# Patient Record
Sex: MALE | Race: WHITE | Employment: UNEMPLOYED | URBAN - METROPOLITAN AREA
[De-identification: names, ages, dates, MRNs, and addresses within clinical notes are randomized per-mention and may not be internally consistent; named-entity substitution may affect disease eponyms.]

---

## 2017-06-27 DIAGNOSIS — I10 ESSENTIAL HYPERTENSION: ICD-10-CM

## 2017-06-27 RX ORDER — LOSARTAN POTASSIUM 100 MG/1
100 TABLET ORAL DAILY
Qty: 90 TAB | Refills: 1 | OUTPATIENT
Start: 2017-06-27

## 2017-06-27 NOTE — TELEPHONE ENCOUNTER
Incoming fax received requesting refill for Cozaar 100mg daily. Pt was last seen by a provider on 3/25/16. Refill denied with note that pt needs to see provider before refills can be approved. Faxed to [phone].   Tori Linares RN

## 2018-04-20 ENCOUNTER — HOSPITAL ENCOUNTER (OUTPATIENT)
Dept: PREADMISSION TESTING | Age: 53
Discharge: HOME | End: 2018-04-20
Attending: COLON & RECTAL SURGERY
Payer: SUBSIDIZED

## 2018-04-20 ENCOUNTER — HOSPITAL ENCOUNTER (OUTPATIENT)
Dept: GENERAL RADIOLOGY | Age: 53
Discharge: HOME | End: 2018-04-20
Attending: COLON & RECTAL SURGERY
Payer: SUBSIDIZED

## 2018-04-20 VITALS
HEART RATE: 62 BPM | OXYGEN SATURATION: 97 % | BODY MASS INDEX: 31.87 KG/M2 | RESPIRATION RATE: 18 BRPM | DIASTOLIC BLOOD PRESSURE: 93 MMHG | WEIGHT: 203.04 LBS | TEMPERATURE: 98.7 F | SYSTOLIC BLOOD PRESSURE: 165 MMHG | HEIGHT: 67 IN

## 2018-04-20 LAB
ALBUMIN SERPL-MCNC: 3.9 G/DL (ref 3.5–5)
ALBUMIN/GLOB SERPL: 1 {RATIO} (ref 1.1–2.2)
ALP SERPL-CCNC: 132 U/L (ref 45–117)
ALT SERPL-CCNC: 26 U/L (ref 12–78)
ANION GAP SERPL CALC-SCNC: 2 MMOL/L (ref 5–15)
AST SERPL-CCNC: 18 U/L (ref 15–37)
ATRIAL RATE: 58 BPM
BILIRUB SERPL-MCNC: 0.4 MG/DL (ref 0.2–1)
BUN SERPL-MCNC: 13 MG/DL (ref 6–20)
BUN/CREAT SERPL: 12 (ref 12–20)
CALCIUM SERPL-MCNC: 9.1 MG/DL (ref 8.5–10.1)
CALCULATED P AXIS, ECG09: 57 DEGREES
CALCULATED R AXIS, ECG10: 62 DEGREES
CALCULATED T AXIS, ECG11: 40 DEGREES
CHLORIDE SERPL-SCNC: 109 MMOL/L (ref 97–108)
CO2 SERPL-SCNC: 31 MMOL/L (ref 21–32)
CREAT SERPL-MCNC: 1.05 MG/DL (ref 0.7–1.3)
DIAGNOSIS, 93000: NORMAL
ERYTHROCYTE [DISTWIDTH] IN BLOOD BY AUTOMATED COUNT: 12.7 % (ref 11.5–14.5)
GLOBULIN SER CALC-MCNC: 3.8 G/DL (ref 2–4)
GLUCOSE SERPL-MCNC: 105 MG/DL (ref 65–100)
HCT VFR BLD AUTO: 45.5 % (ref 36.6–50.3)
HGB BLD-MCNC: 15.7 G/DL (ref 12.1–17)
MCH RBC QN AUTO: 30.5 PG (ref 26–34)
MCHC RBC AUTO-ENTMCNC: 34.5 G/DL (ref 30–36.5)
MCV RBC AUTO: 88.5 FL (ref 80–99)
NRBC # BLD: 0 K/UL (ref 0–0.01)
NRBC BLD-RTO: 0 PER 100 WBC
P-R INTERVAL, ECG05: 152 MS
PLATELET # BLD AUTO: 211 K/UL (ref 150–400)
PMV BLD AUTO: 9 FL (ref 8.9–12.9)
POTASSIUM SERPL-SCNC: 4.6 MMOL/L (ref 3.5–5.1)
PROT SERPL-MCNC: 7.7 G/DL (ref 6.4–8.2)
Q-T INTERVAL, ECG07: 388 MS
QRS DURATION, ECG06: 88 MS
QTC CALCULATION (BEZET), ECG08: 380 MS
RBC # BLD AUTO: 5.14 M/UL (ref 4.1–5.7)
SODIUM SERPL-SCNC: 142 MMOL/L (ref 136–145)
VENTRICULAR RATE, ECG03: 58 BPM
WBC # BLD AUTO: 6.9 K/UL (ref 4.1–11.1)

## 2018-04-20 PROCEDURE — 80053 COMPREHEN METABOLIC PANEL: CPT | Performed by: COLON & RECTAL SURGERY

## 2018-04-20 PROCEDURE — 71046 X-RAY EXAM CHEST 2 VIEWS: CPT

## 2018-04-20 PROCEDURE — 93005 ELECTROCARDIOGRAM TRACING: CPT

## 2018-04-20 PROCEDURE — 85027 COMPLETE CBC AUTOMATED: CPT | Performed by: COLON & RECTAL SURGERY

## 2018-04-20 PROCEDURE — 36415 COLL VENOUS BLD VENIPUNCTURE: CPT | Performed by: COLON & RECTAL SURGERY

## 2018-04-20 RX ORDER — CALCIUM CARBONATE/VITAMIN D3 500-10/5ML
1 LIQUID (ML) ORAL AS NEEDED
COMMUNITY

## 2018-04-20 RX ORDER — BISMUTH SUBSALICYLATE 262 MG
1 TABLET,CHEWABLE ORAL AS NEEDED
COMMUNITY

## 2018-04-20 NOTE — PERIOP NOTES
[facility]  Preoperative Instructions        Surgery Date 4/27/2018          Time of Arrival 5:45 a.m.    1. On the day of your surgery, please report to the Surgical Services Registration Desk and sign in at your designated time. The Surgery Center is located to the right of the Emergency Room. 2. You must have someone with you to drive you home. You should not drive a car for 24 hours following surgery. Please make arrangements for a friend or family member to stay with you for the first 24 hours after your surgery. 3. Do not have anything to eat or drink (including water, gum, mints, coffee, juice) after midnight. ?This may not apply to medications prescribed by your physician. ?(Please note below the special instructions with medications to take the morning of your procedure.)    4. We recommend you do not drink any alcoholic beverages for 24 hours before and after your surgery. 5. Contact your surgeons office for instructions on the following medications: non-steroidal anti-inflammatory drugs (i.e. Advil, Aleve), vitamins, and supplements. (Some surgeons will want you to stop these medications prior to surgery and others may allow you to take them)  **If you are currently taking Plavix, Coumadin, Aspirin and/or other blood-thinning agents, contact your surgeon for instructions. ** Your surgeon will partner with the physician prescribing these medications to determine if it is safe to stop or if you need to continue taking. Please do not stop taking these medications without instructions from your surgeon    6. Wear comfortable clothes. Wear glasses instead of contacts. Do not bring any money or jewelry. Please bring picture ID, insurance card, and any prearranged co-payment or hospital payment. Do not wear make-up, particularly mascara the morning of your surgery. Do not wear nail polish, particularly if you are having foot /hand surgery.   Wear your hair loose or down, no ponytails, buns, carin pins or clips. All body piercings must be removed. Please shower with antibacterial soap for three consecutive days before and on the morning of surgery, but do not apply any lotions, powders or deodorants after the shower on the day of surgery. Please use a fresh towels after each shower. Please sleep in clean clothes and change bed linens the night before surgery. Please do not shave for 48 hours prior to surgery. Shaving of the face is acceptable. 7. You should understand that if you do not follow these instructions your surgery may be cancelled. If your physical condition changes (I.e. fever, cold or flu) please contact your surgeon as soon as possible. 8. It is important that you be on time. If a situation occurs where you may be late, please call [phone] (OR Holding Area). 9. If you have any questions and or problems, please call [phone] (Pre-admission Testing). 10. Your surgery time may be subject to change. You will receive a phone call the evening prior if your time changes. 11.  If having outpatient surgery, you must have someone to drive you here, stay with you during the duration of your stay, and to drive you home at time of discharge. 12.   In an effort to improve the efficiency, privacy, and safety for all of our Pre-op patients visitors are not allowed in the Holding area. Once you arrive and are registered your family/visitors will be asked to remain in the waiting room. The Pre-op staff will get you from the Surgical Waiting Area and will explain to you and your family/visitors that the Pre-op phase is beginning. The staff will answer any questions and provide instructions for tracking of the patient, by use of the existing tracking number and color-coded status board in the waiting room.   At this time the staff will also ask for your designated spokesperson information in the event that the physician or staff need to provide an update or obtain any pertinent information. The designated spokesperson will be notified if the physician needs to speak to family during the pre-operative phase. If at any time your family/visitors has questions or concerns they may approach the volunteer desk in the waiting area for assistance. Special Instructions:    MEDICATIONS TO TAKE THE MORNING OF SURGERY WITH A SIP OF WATER: carvedilol      I understand a pre-operative phone call will be made to verify my surgery time. In the event that I am not available, I give permission for a message to be left on my answering service and/or with another person?   Yes [phone] (daughter Jesus Weirton)         ___________________      __________   _________    Libertad Arden of Patient)             (Witness)                (Date and Time)

## 2018-04-20 NOTE — PERIOP NOTES
Patient here for PAT appointment. Medical history and medications reviewed, consent reviewed and signed using [employer],  Liang Elliott #[id number].

## 2018-04-23 RX ORDER — SODIUM CHLORIDE, SODIUM LACTATE, POTASSIUM CHLORIDE, CALCIUM CHLORIDE 600; 310; 30; 20 MG/100ML; MG/100ML; MG/100ML; MG/100ML
25 INJECTION, SOLUTION INTRAVENOUS CONTINUOUS
Status: CANCELLED | OUTPATIENT
Start: 2018-04-27

## 2018-04-23 NOTE — PERIOP NOTES
Pre op CXR results faxed to Dr. Fred Loya and Nancy Whitmore NP at [facility]. Both faxes confirmed.

## 2018-04-27 ENCOUNTER — HOSPITAL ENCOUNTER (OUTPATIENT)
Age: 53
Setting detail: OUTPATIENT SURGERY
Discharge: HOME | End: 2018-04-27
Attending: COLON & RECTAL SURGERY | Admitting: COLON & RECTAL SURGERY
Payer: SUBSIDIZED

## 2018-04-27 ENCOUNTER — ANESTHESIA EVENT (OUTPATIENT)
Dept: SURGERY | Age: 53
End: 2018-04-27
Payer: SUBSIDIZED

## 2018-04-27 ENCOUNTER — ANESTHESIA (OUTPATIENT)
Dept: SURGERY | Age: 53
End: 2018-04-27
Payer: SUBSIDIZED

## 2018-04-27 VITALS
HEIGHT: 67 IN | WEIGHT: 198.85 LBS | RESPIRATION RATE: 20 BRPM | BODY MASS INDEX: 31.21 KG/M2 | HEART RATE: 77 BPM | TEMPERATURE: 98 F | DIASTOLIC BLOOD PRESSURE: 78 MMHG | OXYGEN SATURATION: 97 % | SYSTOLIC BLOOD PRESSURE: 152 MMHG

## 2018-04-27 DIAGNOSIS — K64.2 PROLAPSED INTERNAL HEMORRHOIDS, GRADE 3: Primary | ICD-10-CM

## 2018-04-27 PROCEDURE — 76060000033 HC ANESTHESIA 1 TO 1.5 HR: Performed by: COLON & RECTAL SURGERY

## 2018-04-27 PROCEDURE — 74011250636 HC RX REV CODE- 250/636: Performed by: ANESTHESIOLOGY

## 2018-04-27 PROCEDURE — 77030002996 HC SUT SLK J&J -A: Performed by: COLON & RECTAL SURGERY

## 2018-04-27 PROCEDURE — 74011000272 HC RX REV CODE- 272: Performed by: COLON & RECTAL SURGERY

## 2018-04-27 PROCEDURE — 76210000022 HC REC RM PH II 1.5 TO 2 HR: Performed by: COLON & RECTAL SURGERY

## 2018-04-27 PROCEDURE — 74011250636 HC RX REV CODE- 250/636

## 2018-04-27 PROCEDURE — 74011000250 HC RX REV CODE- 250

## 2018-04-27 PROCEDURE — 77030026438 HC STYL ET INTUB CARD -A: Performed by: ANESTHESIOLOGY

## 2018-04-27 PROCEDURE — 77030011640 HC PAD GRND REM COVD -A: Performed by: COLON & RECTAL SURGERY

## 2018-04-27 PROCEDURE — 88304 TISSUE EXAM BY PATHOLOGIST: CPT | Performed by: COLON & RECTAL SURGERY

## 2018-04-27 PROCEDURE — 74011250637 HC RX REV CODE- 250/637

## 2018-04-27 PROCEDURE — 77030008684 HC TU ET CUF COVD -B: Performed by: ANESTHESIOLOGY

## 2018-04-27 PROCEDURE — 77030031139 HC SUT VCRL2 J&J -A: Performed by: COLON & RECTAL SURGERY

## 2018-04-27 PROCEDURE — 77030002888 HC SUT CHRMC J&J -A: Performed by: COLON & RECTAL SURGERY

## 2018-04-27 PROCEDURE — 76010000149 HC OR TIME 1 TO 1.5 HR: Performed by: COLON & RECTAL SURGERY

## 2018-04-27 PROCEDURE — 76210000063 HC OR PH I REC FIRST 0.5 HR: Performed by: COLON & RECTAL SURGERY

## 2018-04-27 PROCEDURE — 77030032490 HC SLV COMPR SCD KNE COVD -B: Performed by: COLON & RECTAL SURGERY

## 2018-04-27 PROCEDURE — 77030018836 HC SOL IRR NACL ICUM -A: Performed by: COLON & RECTAL SURGERY

## 2018-04-27 PROCEDURE — 74011000250 HC RX REV CODE- 250: Performed by: COLON & RECTAL SURGERY

## 2018-04-27 RX ORDER — LIDOCAINE HYDROCHLORIDE 10 MG/ML
0.1 INJECTION, SOLUTION EPIDURAL; INFILTRATION; INTRACAUDAL; PERINEURAL AS NEEDED
Status: DISCONTINUED | OUTPATIENT
Start: 2018-04-27 | End: 2018-04-27 | Stop reason: HOSPADM

## 2018-04-27 RX ORDER — OXYCODONE AND ACETAMINOPHEN 5; 325 MG/1; MG/1
TABLET ORAL
Status: COMPLETED
Start: 2018-04-27 | End: 2018-04-27

## 2018-04-27 RX ORDER — BUPIVACAINE HYDROCHLORIDE 2.5 MG/ML
INJECTION, SOLUTION EPIDURAL; INFILTRATION; INTRACAUDAL AS NEEDED
Status: DISCONTINUED | OUTPATIENT
Start: 2018-04-27 | End: 2018-04-27 | Stop reason: HOSPADM

## 2018-04-27 RX ORDER — KETOROLAC TROMETHAMINE 30 MG/ML
15 INJECTION, SOLUTION INTRAMUSCULAR; INTRAVENOUS
Status: COMPLETED | OUTPATIENT
Start: 2018-04-27 | End: 2018-04-27

## 2018-04-27 RX ORDER — SODIUM CHLORIDE, SODIUM LACTATE, POTASSIUM CHLORIDE, CALCIUM CHLORIDE 600; 310; 30; 20 MG/100ML; MG/100ML; MG/100ML; MG/100ML
100 INJECTION, SOLUTION INTRAVENOUS CONTINUOUS
Status: DISCONTINUED | OUTPATIENT
Start: 2018-04-27 | End: 2018-04-27 | Stop reason: HOSPADM

## 2018-04-27 RX ORDER — FENTANYL CITRATE 50 UG/ML
INJECTION, SOLUTION INTRAMUSCULAR; INTRAVENOUS AS NEEDED
Status: DISCONTINUED | OUTPATIENT
Start: 2018-04-27 | End: 2018-04-27 | Stop reason: HOSPADM

## 2018-04-27 RX ORDER — FENTANYL CITRATE 50 UG/ML
25 INJECTION, SOLUTION INTRAMUSCULAR; INTRAVENOUS ONCE
Status: COMPLETED | OUTPATIENT
Start: 2018-04-27 | End: 2018-04-27

## 2018-04-27 RX ORDER — SODIUM CHLORIDE 0.9 % (FLUSH) 0.9 %
5-10 SYRINGE (ML) INJECTION AS NEEDED
Status: DISCONTINUED | OUTPATIENT
Start: 2018-04-27 | End: 2018-04-27 | Stop reason: HOSPADM

## 2018-04-27 RX ORDER — ONDANSETRON 2 MG/ML
INJECTION INTRAMUSCULAR; INTRAVENOUS AS NEEDED
Status: DISCONTINUED | OUTPATIENT
Start: 2018-04-27 | End: 2018-04-27 | Stop reason: HOSPADM

## 2018-04-27 RX ORDER — LIDOCAINE HYDROCHLORIDE 20 MG/ML
INJECTION, SOLUTION EPIDURAL; INFILTRATION; INTRACAUDAL; PERINEURAL AS NEEDED
Status: DISCONTINUED | OUTPATIENT
Start: 2018-04-27 | End: 2018-04-27 | Stop reason: HOSPADM

## 2018-04-27 RX ORDER — FENTANYL CITRATE 50 UG/ML
25 INJECTION, SOLUTION INTRAMUSCULAR; INTRAVENOUS
Status: DISCONTINUED | OUTPATIENT
Start: 2018-04-27 | End: 2018-04-27 | Stop reason: HOSPADM

## 2018-04-27 RX ORDER — SODIUM CHLORIDE, SODIUM LACTATE, POTASSIUM CHLORIDE, CALCIUM CHLORIDE 600; 310; 30; 20 MG/100ML; MG/100ML; MG/100ML; MG/100ML
25 INJECTION, SOLUTION INTRAVENOUS CONTINUOUS
Status: DISCONTINUED | OUTPATIENT
Start: 2018-04-27 | End: 2018-04-27 | Stop reason: HOSPADM

## 2018-04-27 RX ORDER — OXYCODONE AND ACETAMINOPHEN 5; 325 MG/1; MG/1
1 TABLET ORAL
Qty: 60 TAB | Refills: 0 | Status: SHIPPED | OUTPATIENT
Start: 2018-04-27

## 2018-04-27 RX ORDER — OXYCODONE AND ACETAMINOPHEN 5; 325 MG/1; MG/1
1 TABLET ORAL ONCE
Status: COMPLETED | OUTPATIENT
Start: 2018-04-27 | End: 2018-04-27

## 2018-04-27 RX ORDER — SODIUM CHLORIDE 9 MG/ML
25 INJECTION, SOLUTION INTRAVENOUS CONTINUOUS
Status: DISCONTINUED | OUTPATIENT
Start: 2018-04-27 | End: 2018-04-27 | Stop reason: HOSPADM

## 2018-04-27 RX ORDER — MIDAZOLAM HYDROCHLORIDE 1 MG/ML
1 INJECTION, SOLUTION INTRAMUSCULAR; INTRAVENOUS AS NEEDED
Status: DISCONTINUED | OUTPATIENT
Start: 2018-04-27 | End: 2018-04-27 | Stop reason: HOSPADM

## 2018-04-27 RX ORDER — MIDAZOLAM HYDROCHLORIDE 1 MG/ML
INJECTION, SOLUTION INTRAMUSCULAR; INTRAVENOUS AS NEEDED
Status: DISCONTINUED | OUTPATIENT
Start: 2018-04-27 | End: 2018-04-27 | Stop reason: HOSPADM

## 2018-04-27 RX ORDER — HYDROMORPHONE HYDROCHLORIDE 1 MG/ML
0.5 INJECTION, SOLUTION INTRAMUSCULAR; INTRAVENOUS; SUBCUTANEOUS
Status: DISCONTINUED | OUTPATIENT
Start: 2018-04-27 | End: 2018-04-27 | Stop reason: HOSPADM

## 2018-04-27 RX ORDER — DEXAMETHASONE SODIUM PHOSPHATE 4 MG/ML
INJECTION, SOLUTION INTRA-ARTICULAR; INTRALESIONAL; INTRAMUSCULAR; INTRAVENOUS; SOFT TISSUE AS NEEDED
Status: DISCONTINUED | OUTPATIENT
Start: 2018-04-27 | End: 2018-04-27 | Stop reason: HOSPADM

## 2018-04-27 RX ORDER — IBUPROFEN 200 MG
200 TABLET ORAL
COMMUNITY

## 2018-04-27 RX ORDER — SUCCINYLCHOLINE CHLORIDE 20 MG/ML
INJECTION INTRAMUSCULAR; INTRAVENOUS AS NEEDED
Status: DISCONTINUED | OUTPATIENT
Start: 2018-04-27 | End: 2018-04-27 | Stop reason: HOSPADM

## 2018-04-27 RX ORDER — MIDAZOLAM HYDROCHLORIDE 1 MG/ML
0.5 INJECTION, SOLUTION INTRAMUSCULAR; INTRAVENOUS
Status: DISCONTINUED | OUTPATIENT
Start: 2018-04-27 | End: 2018-04-27 | Stop reason: HOSPADM

## 2018-04-27 RX ORDER — ROPIVACAINE HYDROCHLORIDE 5 MG/ML
30 INJECTION, SOLUTION EPIDURAL; INFILTRATION; PERINEURAL AS NEEDED
Status: DISCONTINUED | OUTPATIENT
Start: 2018-04-27 | End: 2018-04-27 | Stop reason: HOSPADM

## 2018-04-27 RX ORDER — FENTANYL CITRATE 50 UG/ML
50 INJECTION, SOLUTION INTRAMUSCULAR; INTRAVENOUS AS NEEDED
Status: DISCONTINUED | OUTPATIENT
Start: 2018-04-27 | End: 2018-04-27 | Stop reason: HOSPADM

## 2018-04-27 RX ORDER — SODIUM CHLORIDE 0.9 % (FLUSH) 0.9 %
5-10 SYRINGE (ML) INJECTION EVERY 8 HOURS
Status: DISCONTINUED | OUTPATIENT
Start: 2018-04-27 | End: 2018-04-27 | Stop reason: HOSPADM

## 2018-04-27 RX ORDER — PROPOFOL 10 MG/ML
INJECTION, EMULSION INTRAVENOUS AS NEEDED
Status: DISCONTINUED | OUTPATIENT
Start: 2018-04-27 | End: 2018-04-27 | Stop reason: HOSPADM

## 2018-04-27 RX ORDER — EPHEDRINE SULFATE 50 MG/ML
INJECTION, SOLUTION INTRAVENOUS AS NEEDED
Status: DISCONTINUED | OUTPATIENT
Start: 2018-04-27 | End: 2018-04-27 | Stop reason: HOSPADM

## 2018-04-27 RX ORDER — DIPHENHYDRAMINE HYDROCHLORIDE 50 MG/ML
12.5 INJECTION, SOLUTION INTRAMUSCULAR; INTRAVENOUS AS NEEDED
Status: DISCONTINUED | OUTPATIENT
Start: 2018-04-27 | End: 2018-04-27 | Stop reason: HOSPADM

## 2018-04-27 RX ORDER — PHENYLEPHRINE HCL IN 0.9% NACL 0.4MG/10ML
SYRINGE (ML) INTRAVENOUS AS NEEDED
Status: DISCONTINUED | OUTPATIENT
Start: 2018-04-27 | End: 2018-04-27 | Stop reason: HOSPADM

## 2018-04-27 RX ORDER — ROCURONIUM BROMIDE 10 MG/ML
INJECTION, SOLUTION INTRAVENOUS AS NEEDED
Status: DISCONTINUED | OUTPATIENT
Start: 2018-04-27 | End: 2018-04-27 | Stop reason: HOSPADM

## 2018-04-27 RX ORDER — BUPIVACAINE HYDROCHLORIDE AND EPINEPHRINE 2.5; 5 MG/ML; UG/ML
INJECTION, SOLUTION EPIDURAL; INFILTRATION; INTRACAUDAL; PERINEURAL AS NEEDED
Status: DISCONTINUED | OUTPATIENT
Start: 2018-04-27 | End: 2018-04-27 | Stop reason: HOSPADM

## 2018-04-27 RX ORDER — MORPHINE SULFATE 10 MG/ML
2 INJECTION, SOLUTION INTRAMUSCULAR; INTRAVENOUS
Status: DISCONTINUED | OUTPATIENT
Start: 2018-04-27 | End: 2018-04-27 | Stop reason: HOSPADM

## 2018-04-27 RX ORDER — ONDANSETRON 2 MG/ML
4 INJECTION INTRAMUSCULAR; INTRAVENOUS AS NEEDED
Status: DISCONTINUED | OUTPATIENT
Start: 2018-04-27 | End: 2018-04-27 | Stop reason: HOSPADM

## 2018-04-27 RX ADMIN — Medication 40 MCG: at 07:50

## 2018-04-27 RX ADMIN — Medication 40 MCG: at 07:51

## 2018-04-27 RX ADMIN — SODIUM CHLORIDE, SODIUM LACTATE, POTASSIUM CHLORIDE, AND CALCIUM CHLORIDE 25 ML/HR: 600; 310; 30; 20 INJECTION, SOLUTION INTRAVENOUS at 06:33

## 2018-04-27 RX ADMIN — DEXAMETHASONE SODIUM PHOSPHATE 10 MG: 4 INJECTION, SOLUTION INTRA-ARTICULAR; INTRALESIONAL; INTRAMUSCULAR; INTRAVENOUS; SOFT TISSUE at 08:25

## 2018-04-27 RX ADMIN — ONDANSETRON 4 MG: 2 INJECTION INTRAMUSCULAR; INTRAVENOUS at 08:25

## 2018-04-27 RX ADMIN — PROPOFOL 30 MG: 10 INJECTION, EMULSION INTRAVENOUS at 08:03

## 2018-04-27 RX ADMIN — SUCCINYLCHOLINE CHLORIDE 140 MG: 20 INJECTION INTRAMUSCULAR; INTRAVENOUS at 07:36

## 2018-04-27 RX ADMIN — FENTANYL CITRATE 25 MCG: 50 INJECTION, SOLUTION INTRAMUSCULAR; INTRAVENOUS at 10:30

## 2018-04-27 RX ADMIN — ROCURONIUM BROMIDE 10 MG: 10 INJECTION, SOLUTION INTRAVENOUS at 07:36

## 2018-04-27 RX ADMIN — Medication 80 MCG: at 07:54

## 2018-04-27 RX ADMIN — EPHEDRINE SULFATE 10 MG: 50 INJECTION, SOLUTION INTRAVENOUS at 07:54

## 2018-04-27 RX ADMIN — KETOROLAC TROMETHAMINE 15 MG: 30 INJECTION, SOLUTION INTRAMUSCULAR at 10:32

## 2018-04-27 RX ADMIN — LIDOCAINE HYDROCHLORIDE 100 MG: 20 INJECTION, SOLUTION EPIDURAL; INFILTRATION; INTRACAUDAL; PERINEURAL at 07:36

## 2018-04-27 RX ADMIN — PROPOFOL 180 MG: 10 INJECTION, EMULSION INTRAVENOUS at 07:36

## 2018-04-27 RX ADMIN — MIDAZOLAM HYDROCHLORIDE 2 MG: 1 INJECTION, SOLUTION INTRAMUSCULAR; INTRAVENOUS at 07:33

## 2018-04-27 RX ADMIN — FENTANYL CITRATE 25 MCG: 50 INJECTION, SOLUTION INTRAMUSCULAR; INTRAVENOUS at 08:03

## 2018-04-27 RX ADMIN — FENTANYL CITRATE 75 MCG: 50 INJECTION, SOLUTION INTRAMUSCULAR; INTRAVENOUS at 07:36

## 2018-04-27 RX ADMIN — OXYCODONE HYDROCHLORIDE AND ACETAMINOPHEN 1 TABLET: 5; 325 TABLET ORAL at 09:51

## 2018-04-27 RX ADMIN — OXYCODONE AND ACETAMINOPHEN 1 TABLET: 5; 325 TABLET ORAL at 09:51

## 2018-04-27 NOTE — PERIOP NOTES
Handoff Report from Operating Room to PACU    Report received from [address] [place] [address] [facility] and Kanchan Reyes RN regarding Wiley Pea. Surgeon(s):  Lindsey Camargo MD  And Procedure(s) (LRB):  HEMORRHOIDECTOMY  (N/A)  confirmed   with allergies and dressings discussed. Anesthesia type, drugs, patient history, complications, estimated blood loss, vital signs, intake and output, and last pain medication, lines and temperature were reviewed.

## 2018-04-27 NOTE — OP NOTES
Preop dx: hemorrhoids  Postop dx: same  Procedure: Excisional hemorrhoidectomy  Surgeon: Dr. Sara Alexandra  Anesthesia: gen +30cc 0.25%marcaine with epi  EBL: 5cc  Specimen: left lateral and right posterior grade 3 internal/external hemorrhoids  Complications:  none apparent  Condition: stable to recovery room    Indications: bleeding hemorrhoids    The patient was taken to the OR after being consented. After smooth induction of anesthesia, the patient was positioned prone on the table. All extremities were padded. Time out was performed. Local anesthetic was infiltrated for a local block. The patient was found to have 2 hemorrhoids. The left lateral column was addressed first. A large hill ford anoscope was inserted. The skin was elevated and a v-shaped incision was made on the perineum. The hemorrhoid was undermined taking care to avoid injury to the underlying sphincter complex. The dissection was taken to the apex of the hemorrhoid and ligated with a 3-0 chromic. The incision was closed using a running locking stitch in the anal canal and a running simple suture on the anal margin. This was hemostatic. The remaining columns of hemorrhoids and skin tags were removed in a similar fashion. Gel foam was applied. Sterile gauze was applied. The instrument and sponge counts were correct x2. The patient tolerated the procedure well and was transferred to the recovery room in stable condition.

## 2018-04-27 NOTE — PERIOP NOTES
phone used for Russian speaking pt, pre op interview done, reviewed what pt can expect, opportunity for questions by pt provided. Pt states comfortable.

## 2018-04-27 NOTE — IP AVS SNAPSHOT
[address] [facility] 
[phone] Patient: Freddie Mathew MRN: [id number] :1965 About your hospitalization You were admitted on:  2018 You last received care in the:  [facility] PACU You were discharged on:  2018 Why you were hospitalized Your primary diagnosis was:  Not on File Your diagnoses also included:  Prolapsed Internal Hemorrhoids, Grade 3 Follow-up Information Follow up With Details Comments Contact Info Jalen Mathews MD   [place] [address] [address] [place] [address] 
[phone] Discharge Orders None A check vince indicates which time of day the medication should be taken. My Medications START taking these medications Instructions Each Dose to Equal  
 Morning Noon Evening Bedtime  
 oxyCODONE-acetaminophen 5-325 mg per tablet Commonly known as:  PERCOCET Your last dose was: Your next dose is: Take 1 Tab by mouth every four (4) hours as needed for Pain. Max Daily Amount: 6 Tabs. 1 Tab CONTINUE taking these medications Instructions Each Dose to Equal  
 Morning Noon Evening Bedtime ADVIL 200 mg tablet Generic drug:  ibuprofen Your last dose was: Your next dose is: Take 200 mg by mouth. 200 mg  
    
   
   
   
  
 aspirin 81 mg chewable tablet Your last dose was: Your next dose is: Take 1 Tab by mouth daily. 81 mg  
    
   
   
   
  
 carvedilol 25 mg tablet Commonly known as:  Michelle Fish Your last dose was: Your next dose is: Take 1 Tab by mouth two (2) times daily (with meals). mike 1 tab por la boca 2 veces al edith  
 25 mg  
    
   
   
   
  
 losartan 100 mg tablet Commonly known as:  COZAAR Your last dose was: Your next dose is: Take 1 Tab by mouth daily. mike 1 tab por la boca diaria  
 100 mg  
    
   
   
   
  
 magnesium oxide 400 mg Cap Your last dose was: Your next dose is: Take 1 Tab by mouth as needed. 1 Tab  
    
   
   
   
  
 multivitamin tablet Commonly known as:  ONE A DAY Your last dose was: Your next dose is: Take 1 Tab by mouth as needed. 1 Tab Where to Get Your Medications Information on where to get these meds will be given to you by the nurse or doctor. ! Ask your nurse or doctor about these medications  
  oxyCODONE-acetaminophen 5-325 mg per tablet Opioid Education Prescription Opioids: What You Need to Know: 
 
 
[facility] Discharge Instructions for Ambulatory 23-Hour Surgery Patients 1. Advance to high fiber diet as tolerated. 2. Take Metamucil/Citrucel 1 teaspoon mixed in a glass of water in AM and PM. 
3. Remove dressing at 4PM. 4. Take 2 sitz baths today (warm water baths for 15-20 minutes). Begin four (4) times a day the day after surgery. 5. Apply Nupercainal Ointment (does not need a prescription) to the anal area after sitz baths, place a dry 4x4 gauze over the are between the buttocks. 6. Take pain medication as prescribed. (NO DRIVING WHILE ON PAIN MEDICATION). 7. No lifting any objects weighing more than 40 pounds. 8. No sitting more than 45 minutes without standing, walking, or lying down. 9. You may walk as desired. 10.  Please schedule an appointment in the office within 10-14 days. Call ahead to schedule your appointment [phone]. 11.  Call [facility] [phone] if you have any problems or questions after   hours. 12.  Expect slight bright red blood up to four (4) weeks from surgery. 15.  Stitches are the dissolving type  they do not need removal in the office. 14.  If no bowel movement by 4/29/2018, take 30cc (1 tablespoon) of Milk of Magnesia. Repeat if no results. If still no bowel movement the next day, then call the office. A common side effect of anesthesia following surgery is nausea and/or vomiting. In order to decrease symptoms, it is wise to avoid foods that are high in fat, greasy foods, milk products, and spicy foods for the first 24 hours. Acceptable foods for the first 24 hours following surgery include but are not limited to: 
 
? soup 
? broth 
?  toast  
? crackers ? applesauce 
? bananas  
? mashed potatoes, 
? soft or scrambled eggs 
? oatmeal 
?  jello It is important to eat when taking your pain medication. This will help to prevent nausea. If possible, please try to time your meals with your medications. It is very important to stay hydrated following surgery. Sip fluids frequently while awake. Avoid acidic drinks such as citrus juices and soda for 24 hours. Carbonated beverages may cause bloating and gas. Acceptable fluids include: 
 
? water (flavor packets may add variety) ? coffee or tea (in moderation) ? Gatorade ? Mirza Snowman ? apple juice 
? cranberry juice You are encouraged to cough and deep breathe every hour when awake. This will help to prevent respiratory complications following anesthesia. You may want to hug a pillow when coughing and sneezing to add additional support to the surgical area and to decrease discomfort if you had abdominal or chest surgery.  
 
If you are discharged home with support stockings, you may remove them after 24 hours. Support stockings are used to help prevent blood clots in the legs following surgery. Please take time to review all of your Home Care Instructions and Medication Information sheets provided in your discharge packet. If you have any questions, please contact your surgeons office. Thank you. Hemorroidectomía: Leana Foley en el hogar - [ Hemorrhoidectomy: What to Expect at [facility] ] Krishna recuperación Después de que se le hayan extraído las hemorroides, skyler puede esperar sentirse mejor cada día. Krishna tremayne anal estará adolorida de 2 a 4 semanas. Y podría necesitar analgésicos (medicamentos para el dolor). Es común tener algo de sangrado leve y líquidos angelica o amarillos que salen del ano. [place] es más probable cuando evacue el intestino. Estos síntomas podrían durar entre 1 y 2 meses después de la [facility]. Después de 1 o 2 semanas, usgreg debería poder [facility] de [place] actividades normales. Domenic no kianna cosas que requieran mucho esfuerzo. Mientras se recupera, es importante que evite levantar objetos pesados y que no kianna esfuerzos cuando evacue el intestino. Esta hoja de [employer] idea general del tiempo que le llevará recuperarse. Sin embargo, cada persona se recupera a un ritmo diferente. Siga los pasos que se mencionan a continuación para recuperarse lo más rápido posible. Cómo puede cuidarse en el [facility]? Actividad ? · Descanse cuando se sienta fatigado. ? · Zachary Gillespie. Caminar es fritz buena opción. ? · Deje que krishna cuerpo sane. No se mueva con rapidez ni levante nada pesado hasta que se sienta mejor. ? · Puede bañarse o ducharse diana de costumbre. Cuando termine, séquese la tremayne anal con toques suaves de toalla. ? · Es probable que necesite ausentarse del [place] Vora 1 y 2 semanas. [place] dependerá del tipo de trabajo que kianna y cómo se sienta. Alimentación ? · Siga las instrucciones del médico sobre cómo alimentarse después de la [facility]. ? · Comience a agregar alimentos con alto contenido de fibra a suh dieta 2 o 3 días después de la [place]. [place] facilitará la evacuación del intestino. Y reduce las probabilidades de que vuelva a tener hemorroides. ? · Si no evacua el intestino con regularidad elliot después de la cirugía, trate de evitar el estreñimiento y de no hacer esfuerzos. Minal suficiente agua. Es posible que suh médico le sugiera que tome Dominique, un ablandador de heces o un laxante [place]. Medicamentos ? · Suh médico le dirá si puede volver a jensen [place] medicamentos y cuándo puede volver a hacerlo. También le dará indicaciones sobre cualquier medicamento nuevo que deba jensen usted. ? · Si mike medicamentos que previenen la formación de coágulos de [place], diana warfarina (Coumadin), clopidogrel (Plavix) o aspirina, asegúrese de hablar con suh médico. Él o pham le dirá si debe volver a jensen estos medicamentos y en qué momento. Asegúrese de que entiende exactamente lo que el médico quiere que omar. ? · Sea luan con los medicamentos. Deborah y siga todas las instrucciones de la [place]. ¨ Si el médico le recetó un analgésico (medicamento para el dolor), tómelo según las indicaciones. ¨ Si no está tomando un analgésico recetado, pregúntele a suh médico si puede jensen un medicamento de [employer]. ? · Si suh médico le recetó antibióticos, tómelos según las indicaciones. No deje de tomarlos por el hecho de sentirse mejor. Debe jensen todos los antibióticos hasta terminarlos. ? · Puede aplicarse anestésicos antes y después de las evacuaciones para aliviar el dolor. Otras instrucciones ? · Siéntese en unos pocos centímetros de agua tibia (baño de asiento) de 15 a 20 minutos 3 veces al día, y después de evacuar el intestino. Luego seque la tremayne con toanastacio de toalla. Omar esto siempre que sienta dolor en la tremayne anal.  
? · Evite sentarse en el inodoro por largos períodos de tiempo o esforzarse alice la evacuación del intestino. ? · Mantenga limpia la tremayne anal.  
? · Apoye los pies sobre un banco o taburete pequeño cuando se siente en el inodoro. [place] ayuda a flexionar las caderas y coloca la pelvis en posición de cuclillas. [place] puede facilitar la evacuación del intestino después de la [place]. ? · Utilice toallitas para bebés o toallitas medicinales, diana Tucks, en lugar de papel higiénico después de evacuar el intestino. Estos productos no irritan el ano.  
? · Si suh médico se lo recomienda, utilice crema de hidrocortisona de venta jason sobre la piel de la tremayne anal. [place] puede reducir el dolor y la comezón después de la [place]. ? · Colóquese hielo varias veces al día alice 10 minutos cada vez.  
? · Intente acostarse boca abajo con fritz almohada debajo de las caderas para disminuir la hinchazón. La atención de seguimiento es fritz parte clave de suh tratamiento y seguridad. Asegúrese de hacer y acudir a todas las citas, y llame a suh médico si está teniendo problemas. También es fritz buena idea saber los resultados de los exámenes y mantener fritz lista de los medicamentos que mike. Cuándo debe pedir ayuda? Llame al 911 en cualquier momento que considere que necesita atención de [place]. Por ejemplo, llame si: 
? · Se desmayó (perdió el conocimiento). ? · Tiene dolor repentino en el pecho y falta de [place], o tose terry. ? · Tiene un dolor abdominal intenso. ? Llame a suh médico ahora mismo o busque atención médica inmediata si: 
? · El ano le sangra y empapa 2 o más compresas de gasa grandes. ? · Tiene dolor que no mejora después de jensen suh analgésico.  
? · Tiene señales de infección, tales diana: ¨ Aumento del dolor, la hinchazón, el enrojecimiento o la temperatura. ¨ Vetas rojizas que salen de la herida. ¨ Pus que supura de la herida. Catalina Fontan. ? · Tiene problemas para orinar o evacuar el intestino, en especial si tiene dolor o hinchazón en la parte baja del abdomen. ?Preste especial atención a los cambios en krishna jonah y asegúrese de comunicarse con krishna médico si: 
? · No evacua el intestino después de jensen un laxante. ? · No mejora diana se esperaba. Dónde puede encontrar más información en inglés? Brenda Mehta a [email or web address] Coreen North G703 en la búsqueda para aprender Stefanie Wallace de \"Hemorroidectomía: Bridgett Aiyana en el [facility] - [ Hemorrhoidectomy: What to Expect at [facility] ]. \" 
Revisado: 12 hall, 2017 Versión del contenido: 11.4 © 2310-4662 Healthwise, Incorporated. Las instrucciones de cuidado fueron adaptadas bajo licencia por [employer] (which disclaims liability or warranty for this information). Si usted tiene [place] afección médica o sobre estas instrucciones, siempre pregunte a krishna profesional de jonah. Healthwise, Incorporated niega toda garantía o responsabilidad por krishna uso de esta información. Hemorroidectomía: Bridgett Bronson en el [facility] - [ Hemorrhoidectomy: What to Expect at [facility] ] Krishna recuperación Después de que se le hayan extraído las hemorroides, usted puede esperar sentirse mejor cada día. Krishna tremayne anal estará adolorida de 2 a 4 semanas. Y podría necesitar analgésicos (medicamentos para el dolor). Es común tener algo de sangrado leve y líquidos angelica o amarillos que salen del ano. [place] es más probable cuando evacue el intestino. Estos síntomas podrían durar entre 1 y 2 meses después de la [place]. Después de 1 o 2 semanas, usted debería poder [facility] de [place] actividades normales. Domenic no kianna cosas que requieran mucho esfuerzo. Mientras se recupera, es importante que evite levantar objetos pesados y que no kianna esfuerzos cuando evacue el intestino. Esta hoja de [employer] idea general del tiempo que le llevará recuperarse. Sin embargo, cada persona se recupera a un ritmo diferente. Siga los pasos que se mencionan a continuación para recuperarse lo más rápido posible. Cómo puede cuidarse en el hogar? Actividad ? · Descanse cuando se sienta fatigado. ? · Sharlot Copper. Caminar es fritz buena opción. ? · Deje que suh cuerpo sane. No se mueva con rapidez ni levante nada pesado hasta que se sienta mejor. ? · Puede bañarse o ducharse diana de costumbre. Cuando termine, séquese la tremayne anal con toques suaves de toalla. ? · Es probable que necesite ausentarse del Danae Lambert 1 y 2 semanas. [place] dependerá del tipo de trabajo que kianna y cómo se sienta. Alimentación ? · Siga las instrucciones del médico sobre cómo alimentarse después de la [facility]. ? · Comience a agregar alimentos con alto contenido de fibra a suh dieta 2 o 3 días después de la [facility]. [place] facilitará la evacuación del intestino. Y reduce las probabilidades de que vuelva a tener hemorroides. ? · Si no evacua el intestino con regularidad elliot después de la cirugía, trate de evitar el estreñimiento y de no hacer esfuerzos. Minal suficiente agua. Es posible que suh médico le sugiera que tome Dominique, un ablandador de heces o un laxante [place]. Medicamentos ? · Suh médico le dirá si puede volver a jensen [place] medicamentos y cuándo puede volver a hacerlo. También le dará indicaciones sobre cualquier medicamento nuevo que deba jensen usted. ? · Si mike medicamentos que previenen la formación de coágulos de [place], diana warfarina (Coumadin), clopidogrel (Plavix) o aspirina, asegúrese de hablar con suh médico. Él o pham le dirá si debe volver a jensen estos medicamentos y en qué momento. Asegúrese de que entiende exactamente lo que el médico quiere que kianna. ? · Sea luan con los medicamentos. Deborah y siga todas las instrucciones de la [place]. ¨ Si el médico le recetó un analgésico (medicamento para el dolor), tómelo según las indicaciones. ¨ Si no está tomando un analgésico recetado, pregúntele a suh médico si puede jensen un medicamento de [employer]. ? · Si suh médico le recetó antibióticos, tómelos según las indicaciones. No deje de tomarlos por el hecho de sentirse mejor. Debe jensen todos los antibióticos hasta terminarlos. ? · Puede aplicarse anestésicos antes y después de las evacuaciones para aliviar el dolor. Otras instrucciones ? · Siéntese en unos pocos centímetros de agua tibia (baño de asiento) de 15 a 20 minutos 3 veces al día, y después de evacuar el intestino. Luego seque la tremayne con toques de toalla. Omar esto siempre que sienta dolor en la tremayne anal.  
? · Evite sentarse en el inodoro por largos períodos de tiempo o esforzarse alice la evacuación del intestino. ? · Mantenga limpia la tremayne anal.  
? · Apoye los pies sobre un banco o taburete pequeño cuando se siente en el inodoro. [place] ayuda a flexionar las caderas y coloca la pelvis en posición de cuclillas. [place] puede facilitar la evacuación del intestino después de la [place]. ? · Utilice toallitas para bebés o toallitas medicinales, diana Tucks, en lugar de papel higiénico después de evacuar el intestino. Estos productos no irritan el ano.  
? · Si suh médico se lo recomienda, utilice crema de hidrocortisona de venta jason sobre la piel de la tremayne anal. [place] puede reducir el dolor y la comezón después de la [place]. ? · Colóquese hielo varias veces al día alice 10 minutos cada vez.  
? · Intente acostarse boca abajo con fritz almohada debajo de las caderas para disminuir la hinchazón. La atención de seguimiento es fritz parte clave de suh tratamiento y seguridad. Asegúrese de hacer y acudir a todas las citas, y llame a suh médico si está teniendo problemas. También es fritz buena idea saber los resultados de los exámenes y mantener fritz lista de los medicamentos que mike. Cuándo debe pedir ayuda? Llame al 911 en cualquier momento que considere que necesita atención de [place]. Por ejemplo, llame si: 
? · Se desmayó (perdió el conocimiento). ? · Tiene dolor repentino en el pecho y falta de [place], o tose terry. ? · Tiene un dolor abdominal intenso. ? Llame a suh médico ahora mismo o busque atención médica inmediata si: 
? · El ano le sangra y empapa 2 o más compresas de gasa grandes. ? · Tiene dolor que no mejora después de jensen suh analgésico.  
? · Tiene señales de infección, tales diana: ¨ Aumento del dolor, la hinchazón, el enrojecimiento o la temperatura. ¨ Vetas rojizas que salen de la herida. ¨ Pus que supura de la herida. Nicole Lank. ? · Tiene problemas para orinar o evacuar el intestino, en especial si tiene dolor o hinchazón en la parte baja del abdomen. ?Preste especial atención a los cambios en suh jonah y asegúrese de comunicarse con suh médico si: 
? · No evacua el intestino después de jensen un laxante. ? · No mejora diana se esperaba. Dónde puede encontrar más información en inglés? Anabell Ferrer a [email or web address] Mary Evans N342 en la búsqueda para aprender Terrie Weller de \"Hemorroidectomía: [place] en el hospitals - [ Hemorrhoidectomy: What to Expect at [facility] ]. \" 
Revisado: 12 [place], 2017 Versión del contenido: 11.4 © 9094-0930 Healthwise, Incorporated. Las instrucciones de cuidado fueron adaptadas bajo licencia por [employer] (which disclaims liability or warranty for this information). Si usted tiene [place] afección médica o sobre estas instrucciones, siempre pregunte a suh profesional de jonah. Healthwise, Incorporated niega toda garantía o responsabilidad por suh uso de esta información. Analgésico narcótico/Acetaminofén (Percocet, Norco, Lorcet HD, Lortab 10/325) - (Por vía oral) Para qué se utiliza kailey medicamento:  
Swetha el dolor. Comuníquese de inmediato con un médico o enfermera si usted tiene: · Debilidad extrema, respiración superficial, latidos cardíacos lentos · Confusión severa, desvanecimiento, mareo, desmayo · Piel u ojos amarillos, orina de color oscuro o evacuaciones pálidas · Estreñimiento severo, dolor de [employer], náuseas, vómitos, pérdida del apetito · Sudoración o piel fría y pegajosa Efectos secundarios comunes: · Estreñimiento leve, náuseas, vómitos · Somnolencia, cansancio · Sandrea Rosy © 2017 [address] Information is for End User's use only and may not be sold, redistributed or otherwise used for commercial purposes. Introducing [facility] SERVICES! Ede Reyes introduce portal paciente MyChart . Ahora se puede acceder a partes de suh expediente médico, enviar por correo electrónico la oficina de suh médico y solicitar renovaciones de medicamentos en línea. En suh navegador de Internet , Annie Gamble a [email or web address] Kianna clic en el usuario por [place]ly [place]? Favio Rogers clic aquí en la sesión [facility]. Verá la página de registro [place]. Ingrese suh código de [employer] tammy y diana aparece a continuación. Usted no tendrá que [employer] código después de stephanie completado el proceso de registro . Si usted no se inscribe antes de la fecha de caducidad , debe solicitar un nuevo código. · MyChart Código de acceso : [id number] Expires: 7/23/2018  2:59 PM 
 
Ingresa los últimos cuatro dígitos de suh Número de Seguro Social ( xxxx ) y fecha de nacimiento ( dd / mm / aaaa ) diana se indica y kianna clic en Enviar. Usted será llevado a la siguiente página de registro . Crear un ID MyChart . Esta será suh ID de inicio de sesión de MyChart y no puede ser [place] , por lo que pensar en fritz que es Trula Hollie y fácil de recordar . Crear fritz contraseña MyChart . Usted puede cambiar suh contraseña en cualquier momento . Ingrese suh Password Reset de preguntas y [place] . Le[place]ic se puede utilizar en un momento posterior si usted olvida suh contraseña.  
Introduzca suh dirección de correo electrónico . Juliet Hawkins recibirá Ran Caldwell notificación por correo electrónico cuando la nueva información está disponible en MyChart . Mickeyea Corina clic en Registrarse. Corena Crawford michael y descargar porciones de suh expediente médico. 
Omar clic en el enlace de descarga del menú Resumen para descargar fritz copia portátil de suh información médica . Si tiene [employer] , por favor visite la sección de preguntas frecuentes del sitio web MyChart . Recuerde, MyChart NO es que se utilizará para las necesidades urgentes. Para emergencias médicas , llame al 911 . Ahora disponible en suh iPhone y Android ! Introducing Chase Soto As a [employer] patient, I wanted to make you aware of our electronic visit tool called Chase Soto. [employer] 24/7 allows you to connect within minutes with a medical provider 24 hours a day, seven days a week via a mobile device or tablet or logging into a secure website from your computer. You can access Chase Soto from anywhere in the [place]. A virtual visit might be right for you when you have a simple condition and feel like you just dont want to get out of bed, or cant get away from work for an appointment, when your regular [employer] provider is not available (evenings, weekends or holidays), or when youre out of town and need minor care. Electronic visits cost only $49 and if the [employer] 24/7 provider determines a prescription is needed to treat your condition, one can be electronically transmitted to a nearby pharmacy*. Please take a moment to enroll today if you have not already done so. The enrollment process is free and takes just a few minutes. To enroll, please download the [employer] 24/7 hari to your tablet or phone, or visit [email or web address] to enroll on your computer.    
And, as an [address] [place] [address] patient with a [employer] account, the results of your visits will be scanned into your electronic medical record and your primary care provider will be able to view the scanned results. We urge you to continue to see your regular Lilli Terry provider for your ongoing medical care. And while your primary care provider may not be the one available when you seek a [employer] virtual visit, the peace of mind you get from getting a real diagnosis real time can be priceless. For more information on [employer], view our Frequently Asked Questions (FAQs) at [email or web address]. Sincerely, 
 
Dominic Irby MD 
Chief Medical Officer Hanane Patricia Collins *:  certain medications cannot be prescribed via [employer] Providers Seen During Your Hospitalization Provider Specialty Primary office phone Seven Weinberg MD Colon and Rectal Surgery [phone] Your Primary Care Physician (PCP) Primary Care Physician Office Phone Office Fax Jeanine Carrillo [phone] ** None ** You are allergic to the following Allergen Reactions Amoxicillin Other (comments) Chest pain Recent Documentation Height Weight BMI Smoking Status 1.702 m 90.2 kg 31.15 kg/m2 Former Smoker Emergency Contacts Name Discharge Info Relation Home Work Mobile Spring Sheikh DECLINED CAREGIVER [4] Daughter [21] [phone] Patient Belongings The following personal items are in your possession at time of discharge: 
  Dental Appliances: None  Visual Aid: None      Home Medications: None   Jewelry: None  Clothing: Pants, Undergarments, Shirt, Footwear, Socks    Other Valuables: None  Personal Items Sent to Safe: declined Please provide this summary of care documentation to your next provider. Signatures-by signing, you are acknowledging that this After Visit Summary has been reviewed with you and you have received a copy.   
  
 
  
    
    
 Patient Signature: ____________________________________________________________ Date:  ____________________________________________________________  
  
Fayrene Retort Provider Signature:  ____________________________________________________________ Date:  ____________________________________________________________  
  
  
   
  
[address] 
[phone] Patient: Surya Walker MRN: [id number] :1965 Sobre suh hospitalización Le admitieron el:  2018 Suh tratamiento más reciente fue el:  MRM PACU Le dieron de marko el:  2018 Por qué le ingresaron Suh diagnosis primaria es:  No está archivado/a Suh diagnosis también incluye:  Prolapsed Internal Hemorrhoids, Grade 3 Follow-up Information Follow up With Details Comments Contact Info MD Brendan Brock [address] [facility] 
[phone] Discharge Orders BluelightApp A check vince indicates which time of day the medication should be taken. My Medications EMPIECE a jensen OLSET Instructions Each Dose to Equal  
 Morning Noon Evening Bedtime  
 oxyCODONE-acetaminophen 5-325 mg per tablet También conocido diana:  PERCOCET Your last dose was: Your next dose is: Take 1 Tab by mouth every four (4) hours as needed for Pain. Max Daily Amount: 6 Tabs. 1 Tab SIGA tomando estos medicamentos Instructions Each Dose to Equal  
 Morning Noon Evening Bedtime ADVIL 200 mg tablet Medicamento genérico:  ibuprofen Your last dose was: Your next dose is: Take 200 mg by mouth. 200 mg  
    
   
   
   
  
 aspirin 81 mg chewable tablet Your last dose was: Your next dose is: Take 1 Tab by mouth daily. 81 mg  
    
   
   
   
  
 carvedilol 25 mg tablet También conocido diana:  COREG Your last dose was: Your next dose is: Take 1 Tab by mouth two (2) times daily (with meals). mike 1 tab por la boca 2 veces al edith  
 25 mg  
    
   
   
   
  
 losartan 100 mg tablet También conocido diana:  COZAAR Your last dose was: Your next dose is: Take 1 Tab by mouth daily. mike 1 tab por la boca diaria  
 100 mg  
    
   
   
   
  
 magnesium oxide 400 mg Cap Your last dose was: Your next dose is: Take 1 Tab by mouth as needed. 1 Tab  
    
   
   
   
  
 multivitamin tablet También conocido diana:  ONE A DAY Your last dose was: Your next dose is: Take 1 Tab by mouth as needed. 1 Tab Dónde puede recoger [place] medicamentos Information on where to get these meds will be given to you by the nurse or doctor. ! Pregunte a suh médico o enfermero/a sobre estos medicamentos  
  oxyCODONE-acetaminophen 5-325 mg per tablet Opioid Education Prescription Opioids: What You Need to Know: 
 
 
[facility] Discharge Instructions for Ambulatory 23-Hour Surgery Patients 1. Advance to high fiber diet as tolerated. 2. Take Metamucil/Citrucel 1 teaspoon mixed in a glass of water in AM and PM. 
3. Remove dressing at 4PM. 4. Take 2 sitz baths today (warm water baths for 15-20 minutes). Begin four (4) times a day the day after surgery. 5. Apply Nupercainal Ointment (does not need a prescription) to the anal area after sitz baths, place a dry 4x4 gauze over the are between the buttocks. 6. Take pain medication as prescribed. (NO DRIVING WHILE ON PAIN MEDICATION). 7. No lifting any objects weighing more than 40 pounds. 8. No sitting more than 45 minutes without standing, walking, or lying down. 9. You may walk as desired. 10.  Please schedule an appointment in the office within 10-14 days. Call ahead to schedule your appointment [phone]. 11.  Call Exchange [phone] if you have any problems or questions after   hours. 12.  Expect slight bright red blood up to four (4) weeks from surgery. 15.  Stitches are the dissolving type  they do not need removal in the office. 14.  If no bowel movement by 4/29/2018, take 30cc (1 tablespoon) of Milk of Magnesia. Repeat if no results. If still no bowel movement the next day, then call the office. A common side effect of anesthesia following surgery is nausea and/or vomiting. In order to decrease symptoms, it is wise to avoid foods that are high in fat, greasy foods, milk products, and spicy foods for the first 24 hours. Acceptable foods for the first 24 hours following surgery include but are not limited to: 
 
? soup 
? broth 
?  toast  
? crackers ? applesauce 
? bananas  
? mashed potatoes, 
? soft or scrambled eggs 
? oatmeal 
?  jello It is important to eat when taking your pain medication. This will help to prevent nausea. If possible, please try to time your meals with your medications. It is very important to stay hydrated following surgery. Sip fluids frequently while awake. Avoid acidic drinks such as citrus juices and soda for 24 hours. Carbonated beverages may cause bloating and gas. Acceptable fluids include: 
 
? water (flavor packets may add variety) ? coffee or tea (in moderation) ? Gatorade ? Sable Hug ? apple juice 
? cranberry juice You are encouraged to cough and deep breathe every hour when awake. This will help to prevent respiratory complications following anesthesia. You may want to hug a pillow when coughing and sneezing to add additional support to the surgical area and to decrease discomfort if you had abdominal or chest surgery. If you are discharged home with support stockings, you may remove them after 24 hours. Support stockings are used to help prevent blood clots in the legs following surgery. Please take time to review all of your Home Care Instructions and Medication Information sheets provided in your discharge packet. If you have any questions, please contact your surgeons office. Thank you. Hemorroidectomía: Donnice Riser en el hogar - [ Hemorrhoidectomy: What to Expect at [facility] ] Suh recuperación Después de que se le hayan extraído las hemorroides, usted puede esperar sentirse mejor cada día. Suh tremayne anal estará adolorida de 2 a 4 semanas. Y podría necesitar analgésicos (medicamentos para el dolor). Es común tener algo de sangrado leve y líquidos angelica o amarillos que salen del ano. [place] es más probable cuando evacue el intestino. Estos síntomas podrían durar entre 1 y 2 meses después de la [place]. Después de 1 o 2 semanas, usted debería poder [facility] de [place] actividades normales. Domenic no kianna cosas que requieran mucho esfuerzo. Mientras se recupera, es importante que evite levantar objetos pesados y que no kianna esfuerzos cuando evacue el intestino. Esta hoja de [employer] idea general del tiempo que le llevará recuperarse. Sin embargo, cada persona se recupera a un ritmo diferente. Siga los pasos que se mencionan a continuación para recuperarse lo más rápido posible. Cómo puede cuidarse en el [facility]? Actividad ? · Descanse cuando se sienta fatigado. ? · Yrn Quinones. Caminar es fritz buena opción. ? · Deje que suh cuerpo sane.  No se mueva con rapidez ni levante nada pesado hasta que se sienta mejor. ? · Puede bañarse o ducharse diana de costumbre. Cuando termine, séquese la tremayne anal con toques suaves de toalla. ? · Es probable que necesite ausentarse del [place] 1 y 2 semanas. [place] dependerá del tipo de trabajo que kianna y cómo se sienta. Alimentación ? · Siga las instrucciones del médico sobre cómo alimentarse después de la [place]. ? · Comience a agregar alimentos con alto contenido de fibra a suh dieta 2 o 3 días después de la [place]. [place] facilitará la evacuación del intestino. Y reduce las probabilidades de que vuelva a tener hemorroides. ? · Si no evacua el intestino con regularidad elliot después de la cirugía, trate de evitar el estreñimiento y de no hacer esfuerzos. Minal suficiente agua. Es posible que suh médico le sugiera que tome Dominique, un ablandador de heces o un laxante [place]. Medicamentos ? · Suh médico le dirá si puede volver a jensen [place] medicamentos y cuándo puede volver a hacerlo. También le dará indicaciones sobre cualquier medicamento nuevo que deba jensen usted. ? · Si mike medicamentos que previenen la formación de coágulos de Dalila, diana warfarina (Coumadin), clopidogrel (Plavix) o aspirina, asegúrese de hablar con suh médico. Él o pham le dirá si debe volver a jensen estos medicamentos y en qué momento. Asegúrese de que entiende exactamente lo que el médico quiere que kianna. ? · Sea luan con los medicamentos. Deborah y siga todas las instrucciones de la [place]. ¨ Si el médico le recetó un analgésico (medicamento para el dolor), tómelo según las indicaciones. ¨ Si no está tomando un analgésico recetado, pregúntele a suh médico si puede jensen un medicamento de [employer]. ? · Si suh médico le recetó antibióticos, tómelos según las indicaciones. No deje de tomarlos por el hecho de sentirse mejor. Debe jensen todos los antibióticos hasta terminarlos.   
? · Puede aplicarse anestésicos antes y después de las evacuaciones para aliviar el dolor. Otras instrucciones ? · Siéntese en unos pocos centímetros de agua tibia (baño de asiento) de 15 a 20 minutos 3 veces al día, y después de evacuar el intestino. Luego seque la tremayne con toques de toalla. Omar esto siempre que sienta dolor en la tremayne anal.  
? · Evite sentarse en el inodoro por largos períodos de tiempo o esforzarse alice la evacuación del intestino. ? · Mantenga limpia la tremayne anal.  
? · Apoye los pies sobre un banco o taburete pequeño cuando se siente en el inodoro. [place] ayuda a flexionar las caderas y coloca la pelvis en posición de cuclillas. [place] puede facilitar la evacuación del intestino después de la [place]. ? · Utilice toallitas para bebés o toallitas medicinales, diana Tucks, en lugar de papel higiénico después de evacuar el intestino. Estos productos no irritan el ano.  
? · Si suh médico se lo recomienda, utilice crema de hidrocortisona de venta jason sobre la piel de la tremayne anal. [place] puede reducir el dolor y la comezón después de la [place]. ? · Colóquese hielo varias veces al día alice 10 minutos cada vez.  
? · Intente acostarse boca abajo con fritz almohada debajo de las caderas para disminuir la hinchazón. La atención de seguimiento es fritz parte clave de suh tratamiento y seguridad. Asegúrese de hacer y acudir a todas las citas, y llame a suh médico si está teniendo problemas. También es fritz buena idea saber los resultados de los exámenes y mantener fritz lista de los medicamentos que mike. Cuándo debe pedir ayuda? Llame al 911 en cualquier momento que considere que necesita atención de [place]. Por ejemplo, llame si: 
? · Se desmayó (perdió el conocimiento). ? · Tiene dolor repentino en el pecho y falta de [place], o tose terry. ? · Tiene un dolor abdominal intenso. ? Llame a suh médico ahora mismo o busque atención médica inmediata si: 
? · El ano le sangra y empapa 2 o más compresas de gasa grandes. ? · Tiene dolor que no mejora después de jensen krishna analgésico.  
? · Tiene señales de infección, tales diana: ¨ Aumento del dolor, la hinchazón, el enrojecimiento o la temperatura. ¨ Vetas rojizas que salen de la herida. ¨ Pus que supura de la herida. Elby Sharad. ? · Tiene problemas para orinar o evacuar el intestino, en especial si tiene dolor o hinchazón en la parte baja del abdomen. ?Preste especial atención a los cambios en krishna jonah y asegúrese de comunicarse con krishna médico si: 
? · No evacua el intestino después de jensen un laxante. ? · No mejora diana se esperaba. Dónde puede encontrar más información en inglés? Kayleigh Leone a [email or web address] Sharonda Moya [id number] en la búsqueda para aprender Mavis Felipe de \"Hemorroidectomía: Joe Roldan en el [facility] - [ Hemorrhoidectomy: What to Expect at [facility] ]. \" 
Revisado: 12 hall, 2017 Versión del contenido: 11.4 © 7388-4189 Healthwise, Incorporated. Las instrucciones de cuidado fueron adaptadas bajo licencia por Good Help Connections (which disclaims liability or warranty for this information). Si usted tiene [place] afección médica o sobre estas instrucciones, siempre pregunte a krishna profesional de jonah. Healthwise, Incorporated niega toda garantía o responsabilidad por krishna uso de esta información. Hemorroidectomía: Joe Roldan en el hogar - [ Hemorrhoidectomy: What to Expect at [facility] ] Krishna recuperación Después de que se le hayan extraído las hemorroides, usted puede esperar sentirse mejor cada día. Krishna tremayne anal estará adolorida de 2 a 4 semanas. Y podría necesitar analgésicos (medicamentos para el dolor). Es común tener algo de sangrado leve y líquidos angelica o amarillos que salen del ano. [place] es más probable cuando evacue el intestino. Estos síntomas podrían durar entre 1 y 2 meses después de la [facility].  
Después de 1 o 2 semanas, usted debería poder [facility] de [place] actividades normales. Domenic no kianna cosas que requieran mucho esfuerzo. Mientras se recupera, es importante que evite levantar objetos pesados y que no kianna esfuerzos cuando evacue el intestino. Esta hoja de [employer] idea general del tiempo que le llevará recuperarse. Sin embargo, cada persona se recupera a un ritmo diferente. Siga los pasos que se mencionan a continuación para recuperarse lo más rápido posible. Cómo puede cuidarse en el hogar? Actividad ? · Descanse cuando se sienta fatigado. ? · Eilleen Sans. Caminar es fritz buena opción. ? · Deje que suh cuerpo sane. No se mueva con rapidez ni levante nada pesado hasta que se sienta mejor. ? · Puede bañarse o ducharse diana de costumbre. Cuando termine, séquese la tremayne anal con toques suaves de toalla. ? · Es probable que necesite ausentarse del [place] 1 y 2 semanas. [place] dependerá del tipo de trabajo que kianna y cómo se sienta. Alimentación ? · Siga las instrucciones del médico sobre cómo alimentarse después de la [place]. ? · Comience a agregar alimentos con alto contenido de fibra a suh dieta 2 o 3 días después de la [facility]. [place] facilitará la evacuación del intestino. Y reduce las probabilidades de que vuelva a tener hemorroides. ? · Si no evacua el intestino con regularidad elliot después de la cirugía, trate de evitar el estreñimiento y de no hacer esfuerzos. Minal suficiente agua. Es posible que suh médico le sugiera que tome [place], un ablandador de heces o un laxante [place]. Medicamentos ? · Suh médico le dirá si puede volver a jensen [place] medicamentos y cuándo puede volver a hacerlo. También le dará indicaciones sobre cualquier medicamento nuevo que deba jensen usted.   
? · Si mike medicamentos que previenen la formación de coágulos de Dalila, diana warfarina (Coumadin), clopidogrel (Plavix) o aspirina, asegúrese de hablar con suh médico. Él o pham le dirá si debe volver a jensen [employer] medicamentos y en qué momento. Asegúrese de que entiende exactamente lo que el médico quiere que omar. ? · Sea luan con los medicamentos. Deborah y siga todas las instrucciones de la [place]. ¨ Si el médico le recetó un analgésico (medicamento para el dolor), tómelo según las indicaciones. ¨ Si no está tomando un analgésico recetado, pregúntele a suh médico si puede jensen un medicamento de [facility]. ? · Si suh médico le recetó antibióticos, tómelos según las indicaciones. No deje de tomarlos por el hecho de sentirse mejor. Debe jensen todos los antibióticos hasta terminarlos. ? · Puede aplicarse anestésicos antes y después de las evacuaciones para aliviar el dolor. Otras instrucciones ? · Siéntese en unos pocos centímetros de agua tibia (baño de asiento) de 15 a 20 minutos 3 veces al día, y después de evacuar el intestino. Luego seque la tremayne con toques de toalla. Omar esto siempre que sienta dolor en la tremayne anal.  
? · Evite sentarse en el inodoro por largos períodos de tiempo o esforzarse alice la evacuación del intestino. ? · Mantenga limpia la tremayne anal.  
? · Apoye los pies sobre un banco o taburete pequeño cuando se siente en el inodoro. [place] ayuda a flexionar las caderas y coloca la pelvis en posición de cuclillas. [place] puede facilitar la evacuación del intestino después de la [place]. ? · Utilice toallitas para bebés o toallitas medicinales, diana Tucks, en lugar de papel higiénico después de evacuar el intestino. Estos productos no irritan el ano.  
? · Si suh médico se lo recomienda, utilice crema de hidrocortisona de venta jason sobre la piel de la tremayne anal. [place] puede reducir el dolor y la comezón después de la [place]. ? · Colóquese hielo varias veces al día alice 10 minutos cada vez.  
? · Intente acostarse boca abajo con fritz almohada debajo de las caderas para disminuir la hinchazón.   
Glennda Hals de seguimiento es fritz parte clave de suh tratamiento y seguridad. Asegúrese de hacer y acudir a todas las citas, y llame a suh médico si está teniendo problemas. También es fritz buena idea saber los resultados de los exámenes y mantener fritz lista de los medicamentos que mike. Cuándo debe pedir ayuda? Llame al 911 en cualquier momento que considere que necesita atención de [place]. Por ejemplo, llame si: 
? · Se desmayó (perdió el conocimiento). ? · Tiene dolor repentino en el pecho y falta de [place], o tose terry. ? · Tiene un dolor abdominal intenso. ? Llame a suh médico ahora mismo o busque atención médica inmediata si: 
? · El ano le sangra y empapa 2 o más compresas de gasa grandes. ? · Tiene dolor que no mejora después de jensen suh analgésico.  
? · Tiene señales de infección, tales diana: ¨ Aumento del dolor, la hinchazón, el enrojecimiento o la temperatura. ¨ Vetas rojizas que salen de la herida. ¨ Pus que supura de la herida. Dorla Amel. ? · Tiene problemas para orinar o evacuar el intestino, en especial si tiene dolor o hinchazón en la parte baja del abdomen. ?Preste especial atención a los cambios en suh jonah y asegúrese de comunicarse con suh médico si: 
? · No evacua el intestino después de jensen un laxante. ? · No mejora daina se esperaba. Dónde puede encontrar más información en inglés? Margret Torres a [email or web address] Mayank Catherine [id number] en la búsqueda para aprender Risa Cam de \"Hemorroidectomía: Rebekah Stager en el hogar - [ Hemorrhoidectomy: What to Expect at [facility] ]. \" 
Revisado: 12 hall, 2017 Versión del contenido: 11.4 © 5384-9216 Healthwise, [employer]. Las instrucciones de cuidado fueron adaptadas bajo licencia por Good Help Connections (which disclaims liability or warranty for this information). Si usted tiene [place] afección médica o sobre estas instrucciones, siempre pregunte a suh profesional de jonah. Intr[employer], [employer] niega toda garantía o responsabilidad por suh uso de esta información. Analgésico narcótico/Acetaminofén (Percocet, Norco, Lorcet HD, Lortab 10/325) - (Por vía oral) Para qué se utiliza kailey medicamento:  
Swetha el dolor. Comuníquese de inmediato con un médico o enfermera si usted tiene: · Debilidad extrema, respiración superficial, latidos cardíacos lentos · Confusión severa, desvanecimiento, mareo, desmayo · Piel u ojos amarillos, orina de color oscuro o evacuaciones pálidas · Estreñimiento severo, dolor de [employer], náuseas, vómitos, pérdida del apetito · Sudoración o piel fría y pegajosa Efectos secundarios comunes: · Estreñimiento leve, náuseas, vómitos · Somnolencia, cansancio · Joellen Mater © 2017 [address]  Information is for End User's use only and may not be sold, redistributed or otherwise used for commercial purposes. Introducing [facility]! Bon Eric introduce portal paciente PixelSteamhart . Ahora se puede acceder a partes de suh expediente médico, enviar por correo electrónico la oficina de suh médico y solicitar renovaciones de medicamentos en línea. En suh navegador de Internet , Jean Larsen a [email or web address] Kianna clic en el usuario por Thiago Wells? Kristopher [place] clic aquí en la sesión Drenda Dapper. Verá la página de registro [place]. Ingrese suh código de [employer] tammy y diana aparece a continuación. Usted no tendrá que [employer] código después de stephanie completado el proceso de registro . Si usted no se inscribe antes de la fecha de caducidad , debe solicitar un nuevo código. · MyChart Código de acceso : [id number] Expires: 7/23/2018  2:59 PM 
 
Ingresa los últimos cuatro dígitos de suh Número de Seguro Social ( xxxx ) y fecha de nacimiento ( dd / mm / aaaa ) diana se indica y kianna clic en Enviar. Usted será llevado a la siguiente página de registro . Crear un ID MyChart .  Esta será suh ID de inicio de sesión de MyChart y no puede ser [place] , por lo que pensar en fritz que es saini y fácil de recordar . Crear fritz contraseña MyChart . Usted puede cambiar suh contraseña en cualquier momento . Ingrese suh Password Reset de preguntas y [place] . Campo se puede utilizar en un momento posterior si usted olvida suh contraseña. Introduzca suh dirección de correo electrónico . Meagan Arana recibirá fritz notificación por correo electrónico cuando la nueva información está disponible en MyChart . [place] clic en Registrarse. [place] Mason michael y descargar porciones de suh expediente médico. 
Omar clic en el enlace de descarga del menú Resumen para descargar fritz copia portátil de suh información médica . Si tiene [employer] , por favor visite la sección de preguntas frecuentes del sitio web [employer] . Recuerde, Amaruhart NO es que se utilizará para las necesidades urgentes. Para emergencias médicas , llame al 911 . Ahora disponible en suh iPhone y Android ! Introducing Chase Soto As a Marcelle Los patient, I wanted to make you aware of our electronic visit tool called Chase Darrickwhitfranky. [facility] Los 24/7 allows you to connect within minutes with a medical provider 24 hours a day, seven days a week via a mobile device or tablet or logging into a secure website from your computer. You can access Chase Soto from anywhere in the [place]. A virtual visit might be right for you when you have a simple condition and feel like you just dont want to get out of bed, or cant get away from work for an appointment, when your regular Marcelle Los provider is not available (evenings, weekends or holidays), or when youre out of town and need minor care. Electronic visits cost only $49 and if the Marcelle Los 24/7 provider determines a prescription is needed to treat your condition, one can be electronically transmitted to a nearby pharmacy*. Please take a moment to enroll today if you have not already done so.   The enrollment process is free and takes just a few minutes. To enroll, please download the Willadean Saint 24/7 hari to your tablet or phone, or visit [email or web address] to enroll on your computer. And, as an [address] [place] [address] patient with a 3D Forms account, the results of your visits will be scanned into your electronic medical record and your primary care provider will be able to view the scanned results. We urge you to continue to see your regular Willadean Saint provider for your ongoing medical care. And while your primary care provider may not be the one available when you seek a Intrakr virtual visit, the peace of mind you get from getting a real diagnosis real time can be priceless. For more information on Intrakr, view our Frequently Asked Questions (FAQs) at [email or web address]. Sincerely, 
 
Krystle Pimentel MD 
Chief Medical Officer [employer] *:  certain medications cannot be prescribed via Intrakr Providers Seen During Your Hospitalization Personal Médico Especialidad Teléfono principal de la oficina Florence Rolon MD Colon and Rectal Surgery [phone] Krishna médico de atención primaria (PCP ) Primary Care Physician Office Phone Office Fax Emerson Eisenberg [phone] ** None ** Tiene alergias a lo siguiente Ludger Banker Amoxicillin Other (comments) Chest pain Documentación recientes Height Weight BMI ([facility]) Estatus de tabaquísmo 1.702 m 90.2 kg 31.15 kg/m2 Former Smoker Emergency Contacts Name Discharge Info Relation Home Work Mobile Spring Sheikh DECLINED CAREGIVER [4] Daughter [21] [phone] Patient Belongings  The following personal items are in your possession at time of discharge: 
  Dental Appliances: None  Visual Aid: None      Home Medications: None   Jewelry: None  Clothing: Pants, Undergarments, Shirt, Footwear, Socks Other Valuables: None  Personal Items Sent to Safe: declined Please provide this summary of care documentation to your next provider. Signatures-by signing, you are acknowledging that this After Visit Summary has been reviewed with you and you have received a copy. Patient Signature:  ____________________________________________________________ Date:  ____________________________________________________________  
  
[facility]an [facility] Provider Signature:  ____________________________________________________________ Date:  ____________________________________________________________

## 2018-04-27 NOTE — DISCHARGE INSTRUCTIONS
Brandon Campos MD, FACS  Aquiles BRIGHT. Yolanda Manuel MD, FACS  Bautista Laura. Anthony Mckeon MD, [address] [facility] Harris Joseph MD, [address] [facility] Lauren Nelson MD, FACS  Narendra Crawley. MD River Benitez MD    [facility] Discharge Instructions for Ambulatory 23-Hour Surgery Patients    1. Advance to high fiber diet as tolerated. 2. Take Metamucil/Citrucel 1 teaspoon mixed in a glass of water in AM and PM.  3. Remove dressing at 4PM.  4. Take 2 sitz baths today (warm water baths for 15-20 minutes). Begin four (4) times a day the day after surgery. 5. Apply Nupercainal Ointment (does not need a prescription) to the anal area after sitz baths, place a dry 4x4 gauze over the are between the buttocks. 6. Take pain medication as prescribed. (NO DRIVING WHILE ON PAIN MEDICATION). 7. No lifting any objects weighing more than 40 pounds. 8. No sitting more than 45 minutes without standing, walking, or lying down. 9. You may walk as desired. 10.  Please schedule an appointment in the office within 10-14 days. Call ahead to schedule your appointment [phone]. 11.  Call Exchange [phone] if you have any problems or questions after   hours. 12.  Expect slight bright red blood up to four (4) weeks from surgery. 13.  Stitches are the dissolving type - they do not need removal in the office. 14.  If no bowel movement by 4/29/2018, take 30cc (1 tablespoon) of Milk of Magnesia. Repeat if no results. If still no bowel movement the next day, then call the office. A common side effect of anesthesia following surgery is nausea and/or vomiting. In order to decrease symptoms, it is wise to avoid foods that are high in fat, greasy foods, milk products, and spicy foods for the first 24 hours.     Acceptable foods for the first 24 hours following surgery include but are not limited to:     soup   broth    toast    crackers    applesauce    bananas    mashed potatoes,   soft or scrambled eggs   oatmeal    jello    It is important to eat when taking your pain medication. This will help to prevent nausea. If possible, please try to time your meals with your medications. It is very important to stay hydrated following surgery. Sip fluids frequently while awake. Avoid acidic drinks such as citrus juices and soda for 24 hours. Carbonated beverages may cause bloating and gas. Acceptable fluids include:    - water (flavor packets may add variety)  - coffee or tea (in moderation)  - Gatorade  - Zackary-aid  - apple juice  - cranberry juice    You are encouraged to cough and deep breathe every hour when awake. This will help to prevent respiratory complications following anesthesia. You may want to hug a pillow when coughing and sneezing to add additional support to the surgical area and to decrease discomfort if you had abdominal or chest surgery. If you are discharged home with support stockings, you may remove them after 24 hours. Support stockings are used to help prevent blood clots in the legs following surgery. Please take time to review all of your Home Care Instructions and Medication Information sheets provided in your discharge packet. If you have any questions, please contact your surgeons office. Thank you. Hemorroidectomía: Diegoonie Trevin en el [facility] - [ Hemorrhoidectomy: What to Expect at Home ]  Krishna recuperación    Después de que se le hayan extraído las hemorroides, usted puede esperar sentirse mejor Richard Holden. Krishna tremayne anal estará adolorida de 2 a 4 semanas. Y podría necesitar analgésicos (medicamentos para el dolor). Es común tener algo de sangrado leve y líquidos angelica o amarillos que salen del ano. [place] es más probable cuando evacue el intestino. Estos síntomas podrían durar entre 1 y 2 meses después de la [place]. Después de 1 o 2 semanas, usted debería poder [facility] de [place] actividades normales. Domenic no kianna cosas que requieran mucho esfuerzo.  Mientras se recupera, es importante que evite levantar objetos pesados y que no kianna esfuerzos cuando evacue el intestino. Esta hoja de [employer] idea general del tiempo que le llevará recuperarse. Sin embargo, cada persona se recupera a un ritmo diferente. Siga los pasos que se mencionan a continuación para recuperarse lo más rápido posible. ¿Cómo puede cuidarse en el hogar? Actividad  ? · Descanse cuando se sienta fatigado. ? · Mariluz [place]. Caminar es fritz buena opción. ? · Deje que suh cuerpo sane. No se mueva con rapidez ni levante nada pesado hasta que se sienta mejor. ? · Puede bañarse o ducharse diana de costumbre. Cuando termine, séquese la tremayne anal con toques suaves de toalla. ? · Es probable que necesite ausentarse del [place] 1 y 2 semanas. [place] dependerá del tipo de trabajo que kianna y cómo se sienta. Alimentación  ? · Siga las instrucciones del médico sobre cómo alimentarse después de la [facility]. ? · Comience a agregar alimentos con alto contenido de fibra a suh dieta 2 o 3 días después de la [facility]. [place] facilitará la evacuación del intestino. Y reduce las probabilidades de que vuelva a tener hemorroides. ? · Si no evacua el intestino con regularidad elliot después de la cirugía, trate de evitar el estreñimiento y de no hacer esfuerzos. Minal suficiente agua. Es posible que suh médico le sugiera que tome [place], un ablandador de heces o un laxante [place]. Medicamentos  ? · Shu médico le dirá si puede volver a jensen [place] medicamentos y cuándo puede volver a hacerlo. También le dará indicaciones sobre cualquier medicamento nuevo que deba jensen usted. ? · Si mike medicamentos que previenen la formación de coágulos de [place], diana warfarina (Coumadin), clopidogrel (Plavix) o aspirina, asegúrese de hablar con suh médico. Él o pham le dirá si debe volver a jensen estos medicamentos y en qué momento. Asegúrese de que entiende exactamente lo que el médico quiere que kianna.    ? · Sea luan con los medicamentos. Deborah y siga todas las instrucciones de la [place]. ¨ Si el médico le recetó un analgésico (medicamento para el dolor), tómelo según las indicaciones. ¨ Si no está tomando un analgésico recetado, pregúntele a suh médico si puede jensen un medicamento de [facility]. ? · Si suh médico le recetó antibióticos, tómelos según las indicaciones. No deje de tomarlos por el hecho de sentirse mejor. Debe jensen todos los antibióticos hasta terminarlos. ? · Puede aplicarse anestésicos antes y después de las evacuaciones para aliviar el dolor. Otras instrucciones  ? · Siéntese en unos pocos centímetros de agua tibia (baño de asiento) de 15 a 20 minutos 3 veces al día, y después de evacuar el intestino. Luego seque la tremayne con toques de toalla. Omar esto siempre que sienta dolor en la tremayne anal.   ? · Evite sentarse en el inodoro por largos períodos de tiempo o esforzarse alice la evacuación del intestino. ? · Mantenga limpia la tremayne anal.   ? · Apoye los pies sobre un banco o taburete pequeño cuando se siente en el inodoro. [place] ayuda a flexionar las caderas y coloca la pelvis en posición de cuclillas. [place] puede facilitar la evacuación del intestino después de la [place]. ? · Utilice toallitas para bebés o toallitas medicinales, diana Tucks, en lugar de papel higiénico después de evacuar el intestino. Estos productos no irritan el ano.   ? · Si suh médico se lo recomienda, utilice crema de hidrocortisona de venta jason sobre la piel de la tremayne anal. [place] puede reducir el dolor y la comezón después de la [place]. ? · Colóquese hielo varias veces al día alice 10 minutos cada vez.   ? · Intente acostarse boca abajo con fritz almohada debajo de las caderas para disminuir la hinchazón. La atención de seguimiento es fritz parte clave de suh tratamiento y seguridad. Asegúrese de hacer y acudir a todas las citas, y llame a suh médico si está teniendo problemas.  También es fritz buena idea saber los [place] exámenes y [employer] lista de los medicamentos que mike. ¿Cuándo debe pedir ayuda? Llame al 911 en cualquier momento que considere que necesita atención de [place]. Por ejemplo, llame si:  ? · Se desmayó (perdió el conocimiento). ? · Tiene dolor repentino en el pecho y falta de [place], o tose terry. ? · Tiene un dolor abdominal intenso. ? Llame a krishna médico ahora mismo o busque atención médica inmediata si:  ? · El ano le sangra y empapa 2 o más compresas de gasa grandes. ? · Tiene dolor que no mejora después de jensen krishna analgésico.   ? · Tiene señales de infección, tales diana:  ¨ Aumento del dolor, la hinchazón, el enrojecimiento o la temperatura. ¨ Vetas rojizas que salen de la herida. ¨ Pus que supura de la herida. [place]. ? · Tiene problemas para orinar o evacuar el intestino, en especial si tiene dolor o hinchazón en la parte baja del abdomen. ?Preste especial atención a los cambios en krishna jonah y asegúrese de comunicarse con krishna médico si:  ? · No evacua el intestino después de jensen un laxante. ? · No mejora diana se esperaba. ¿Dónde puede encontrar más información en inglés? [place] a http://tasneem-lizzie.[email or web address] Yessica Hogan R447 en la búsqueda para aprender Rhina Coil de \"Hemorroidectomía: Fernanda Kim en el [facility] - [ Hemorrhoidectomy: What to Expect at [facility] ]. \"  Revisado: 12 [place], 2017  Versión del contenido: 11.4  © 8841-4564 Healthwise, Incorporated. Las instrucciones de cuidado fueron adaptadas bajo licencia por [employer] (which disclaims liability or warranty for this information). Si usted tiene [place] Avant afección médica o sobre estas instrucciones, siempre pregunte a krishna profesional de jonah. [facility] niega toda garantía o responsabilidad por krishna uso de esta información.          Hemorroidectomía: Fernanda Kim en el [facility] - [ Hemorrhoidectomy: What to Expect at [facility] ]  Krishna recuperación    Después de que se le hayan [address] [place] [address] [address]  hemorroides, usted puede esperar sentirse mejor cada día. Suh tremayne anal estará adolorida de 2 a 4 semanas. Y podría necesitar analgésicos (medicamentos para el dolor). Es común tener algo de sangrado leve y líquidos angelica o amarillos que salen del ano. [place] es más probable cuando evacue el intestino. Estos síntomas podrían durar entre 1 y 2 meses después de la [place]. Después de 1 o 2 semanas, usted debería poder [facility] de [place] actividades normales. Domenic no kianna cosas que requieran mucho esfuerzo. Mientras se recupera, es importante que evite levantar objetos pesados y que no kianna esfuerzos cuando evacue el intestino. Esta hoja de [employer] idea general del tiempo que le llevará recuperarse. Sin embargo, cada persona se recupera a un ritmo diferente. Siga los pasos que se mencionan a continuación para recuperarse lo más rápido posible. ¿Cómo puede cuidarse en el hogar? Actividad  ? · Descanse cuando se sienta fatigado. ? · Mary Lou Plaster. Caminar es fritz buena opción. ? · Deje que suh cuerpo sane. No se mueva con rapidez ni levante nada pesado hasta que se sienta mejor. ? · Puede bañarse o ducharse diana de costumbre. Cuando termine, séquese la tremayne anal con toques suaves de toalla. ? · Es probable que necesite ausentarse del Rella Cecilio 1 y 2 semanas. [place] dependerá del tipo de trabajo que kianna y cómo se sienta. Alimentación  ? · Siga las instrucciones del médico sobre cómo alimentarse después de la [place]. ? · Comience a agregar alimentos con alto contenido de fibra a suh dieta 2 o 3 días después de la [place]. [place] facilitará la evacuación del intestino. Y reduce las probabilidades de que vuelva a tener hemorroides. ? · Si no evacua el intestino con regularidad elliot después de la cirugía, trate de evitar el estreñimiento y de no hacer esfuerzos. Minal suficiente agua. Es posible que suh médico le sugiera que tome [place], un ablandador de heces o un laxante [place]. Medicamentos  ? · Suh médico le dirá si puede volver a jensen [place] medicamentos y cuándo puede volver a hacerlo. También le dará indicaciones sobre cualquier medicamento nuevo que deba jensen usted. ? · Si mike medicamentos que previenen la formación de coágulos de [place], diana warfarina (Coumadin), clopidogrel (Plavix) o aspirina, asegúrese de hablar con suh médico. Él o pham le dirá si debe volver a jensen estos medicamentos y en qué momento. Asegúrese de que entiende exactamente lo que el médico quiere que omar. ? · Sea luan con los medicamentos. Deborah y siga todas las instrucciones de la [place]. ¨ Si el médico le recetó un analgésico (medicamento para el dolor), tómelo según las indicaciones. ¨ Si no está tomando un analgésico recetado, pregúntele a suh médico si puede jensen un medicamento de [facility]. ? · Si suh médico le recetó antibióticos, tómelos según las indicaciones. No deje de tomarlos por el hecho de sentirse mejor. Debe jensen todos los antibióticos hasta terminarlos. ? · Puede aplicarse anestésicos antes y después de las evacuaciones para aliviar el dolor. Otras instrucciones  ? · Siéntese en unos pocos centímetros de agua tibia (baño de asiento) de 15 a 20 minutos 3 veces al día, y después de evacuar el intestino. Luego seque la tremayne con faiza rao. Omar esto siempre que sienta dolor en la tremayne anal.   ? · Evite sentarse en el inodoro por largos períodos de tiempo o esforzarse alice la evacuación del intestino. ? · Mantenga limpia la tremayne anal.   ? · Apoye los pies sobre un banco o taburete pequeño cuando se siente en el inodoro. [place] ayuda a flexionar las caderas y coloca la pelvis en posición de cuclillas. [place] puede facilitar la evacuación del intestino después de la [place]. ? · Utilice toallitas para bebés o toallitas medicinales, diana Tucks, en lugar de papel higiénico después de evacuar el intestino.  Estos productos no irritan el ano.   ? · Si suh médico se lo recomienda, utilice crema de hidrocortisona de venta jason sobre la piel de la tremayne anal. [place] puede reducir el dolor y la comezón después de la [place]. ? · Colóquese hielo varias veces al día alice 10 minutos cada vez.   ? · Intente acostarse boca abajo con fritz almohada debajo de las caderas para disminuir la hinchazón. La atención de seguimiento es fritz parte clave de suh tratamiento y seguridad. Asegúrese de hacer y acudir a todas las citas, y llame a suh médico si está teniendo problemas. También es fritz buena idea saber los resultados de los exámenes y mantener fritz lista de los medicamentos que mike. ¿Cuándo debe pedir ayuda? Llame al 911 en cualquier momento que considere que necesita atención de [place]. Por ejemplo, llame si:  ? · Se desmayó (perdió el conocimiento). ? · Tiene dolor repentino en el pecho y falta de [place], o tose terry. ? · Tiene un dolor abdominal intenso. ? Llame a suh médico ahora mismo o busque atención médica inmediata si:  ? · El ano le sangra y empapa 2 o más compresas de gasa grandes. ? · Tiene dolor que no mejora después de jensen suh analgésico.   ? · Tiene señales de infección, tales diana:  ¨ Aumento del dolor, la hinchazón, el enrojecimiento o la temperatura. ¨ Vetas rojizas que salen de la herida. ¨ Pus que supura de la herida. Dorla Amel. ? · Tiene problemas para orinar o evacuar el intestino, en especial si tiene dolor o hinchazón en la parte baja del abdomen. ?Preste especial atención a los cambios en suh jonah y asegúrese de comunicarse con suh médico si:  ? · No evacua el intestino después de jensen un laxante. ? · No mejora diana se esperaba. ¿Dónde puede encontrar más información en inglés? Margret Torres a http://tasneem-lizzie.info/. Mayank Catherine O393 en la búsqueda para aprender Risa Cam de \"Hemorroidectomía: Rebekah Stager en el hogar - [ Hemorrhoidectomy: What to Expect at [facility] ]. \"  Revisado: 12 hall, 2017  Versión del contenido: 11.4  © 9281-7783 [employer]. Las instrucciones de cuidado fueron adaptadas bajo licencia por [employer] (which disclaims liability or warranty for this information). Si usted tiene [place] afección médica o sobre estas instrucciones, siempre pregunte a suh profesional de jonah. Healthwise, Incorporated niega toda garantía o responsabilidad por suh uso de esta información. Analgésico narcótico/Acetaminofén (Percocet, Norco, Lorcet HD, Lortab 10/325) - (Por vía oral)   Para qué se utiliza kailey medicamento:   Swetha el dolor. Comuníquese de inmediato con un médico o enfermera si usted tiene:  · Debilidad extrema, respiración superficial, latidos cardíacos lentos  · Confusión severa, desvanecimiento, mareo, desmayo  · Piel u ojos amarillos, orina de color oscuro o evacuaciones pálidas  · Estreñimiento severo, dolor de estómago severo, náuseas, vómitos, pérdida del apetito  · Sudoración o piel fría y pegajosa     Efectos secundarios comunes:  · Estreñimiento leve, náuseas, vómitos  · Somnolencia, cansancio  · arina Alicea  © 2017 [address] Cisner[address] Information is for End User's use only and may not be sold, redistributed or otherwise used for commercial purposes.

## 2018-04-27 NOTE — ANESTHESIA PREPROCEDURE EVALUATION
Anesthetic History   No history of anesthetic complications            Review of Systems / Medical History  Patient summary reviewed, nursing notes reviewed and pertinent labs reviewed    Pulmonary  Within defined limits                 Neuro/Psych   Within defined limits           Cardiovascular    Hypertension        Dysrhythmias       Exercise tolerance: >4 METS     GI/Hepatic/Renal  Within defined limits              Endo/Other  Within defined limits           Other Findings            Physical Exam    Airway  Mallampati: II  TM Distance: 4 - 6 cm  Neck ROM: normal range of motion   Mouth opening: Normal     Cardiovascular  Regular rate and rhythm,  S1 and S2 normal,  no murmur, click, rub, or gallop             Dental  No notable dental hx       Pulmonary  Breath sounds clear to auscultation               Abdominal  GI exam deferred       Other Findings            Anesthetic Plan    ASA: 2  Anesthesia type: general          Induction: Intravenous  Anesthetic plan and risks discussed with: Patient

## 2018-04-27 NOTE — H&P
Colon and Rectal Surgery History and Physical    Subjective:      Osmin Marie is a 48 y.o. male who has hx hems    Patient Active Problem List    Diagnosis Date Noted    Chest pain 07/02/2015    Palpitations 07/02/2015    SVT (supraventricular tachycardia) ([address] 06/20/2015    Essential hypertension 06/20/2015     Past Medical History:   Diagnosis Date    Chest pain 7/2/2015    HTN (hypertension)     Supraventricular arrhythmia 2006    was hospitalized for 5 days      Past Surgical History:   Procedure Laterality Date    HX CHOLECYSTECTOMY  2013    [facility]    HX ENDOSCOPY  08/2017    [facility]    HX HEENT Left     polyp was removed, neck      Social History   Substance Use Topics    Smoking status: Former Smoker     Quit date: 7/2/2008    Smokeless tobacco: Never Used    Alcohol use No      Family History   Problem Relation Age of Onset    Heart Disease Mother     Cancer Father      gallbladder disease    Gall Bladder Disease Father       Prior to Admission medications    Medication Sig Start Date End Date Taking? Authorizing Provider   ibuprofen (ADVIL) 200 mg tablet Take 200 mg by mouth. Yes Historical Provider   losartan (COZAAR) 100 mg tablet Take 1 Tab by mouth daily. mike 1 tab por la boca diaria 3/19/16  Yes Mela Davidson NP   carvedilol (COREG) 25 mg tablet Take 1 Tab by mouth two (2) times daily (with meals). mike 1 tab por la boca 2 veces al edith 3/19/16  Yes Mela Davidson NP   magnesium oxide 400 mg cap Take 1 Tab by mouth as needed. Historical Provider   multivitamin (ONE A DAY) tablet Take 1 Tab by mouth as needed. Historical Provider   aspirin 81 mg chewable tablet Take 1 Tab by mouth daily.  3/19/16   Mela Davidson NP     Allergies   Allergen Reactions    Amoxicillin Other (comments)     Chest pain        Review of Systems:    A comprehensive review of systems was negative except for that written in the History of Present Illness. Objective:     Visit Vitals    /77 (BP 1 Location: Right arm, BP Patient Position: At rest)    Pulse 66    Temp 98.3 °F (36.8 °C)    Resp 18    Ht 5' 7\" (1.702 m)    Wt 90.2 kg (198 lb 13.7 oz)    SpO2 96%    BMI 31.15 kg/m2        Physical Exam: nad  Chest clear  Heart reg  abd soft    Imaging:  images and reports reviewed    Lab Review:  No results found for this or any previous visit (from the past 24 hour(s)). Labs and radiology: images and reports reviewed      Assessment:   hemorrhoids    Plan:     1. I recommend proceeding with hemorrhoidectomy. Treatment alternatives were discussed. 2. Discussed aspects of surgical intervention, methods, risks (including by not limited to infection, bleeding, hematoma, and perforation of the intestines or solid organs), and the risks of general anesthetic. The patient understands the risks; any and all questions were answered to the patient's satisfaction.     Signed By: Efrem Joseph MD     April 27, 2018

## 2018-04-27 NOTE — PERIOP NOTES
4481:  ID [id number] used via blue phone to complete assessment and answer any questions. Patient denies any pain, reports feeling of need to have bowel movement. Made aware of dressing in place and is likely pressure from packing in rectum. Informed of transfer to discharge area and son will come back to complete discharge process. 0900: report given to AARON Will RN in phase 2.

## 2018-04-27 NOTE — ANESTHESIA POSTPROCEDURE EVALUATION
Post-Anesthesia Evaluation and Assessment    Patient: Jared Saenz MRN: [id number]  SSN: [id number]    YOB: 1965  Age: 48 y.o. Sex: male       Cardiovascular Function/Vital Signs  Visit Vitals    /85    Pulse 66    Temp 36.9 °C (98.4 °F)    Resp 14    Ht 5' 7\" (1.702 m)    Wt 90.2 kg (198 lb 13.7 oz)    SpO2 97%    BMI 31.15 kg/m2       Patient is status post general anesthesia for Procedure(s): HEMORRHOIDECTOMY . Nausea/Vomiting: None    Postoperative hydration reviewed and adequate. Pain:  Pain Scale 1: Numeric (0 - 10) (04/27/18 0840)  Pain Intensity 1: 0 (04/27/18 0840)   Managed    Neurological Status:   Neuro (WDL): Exceptions to WDL (04/27/18 0840)  Neuro  Neurologic State: (P) Alert;Drowsy; Eyes open spontaneously (04/27/18 0840)  Orientation Level: (P) Oriented to person;Oriented to place;Oriented to situation (04/27/18 0840)  Cognition: (P) Follows commands (04/27/18 0840)  Speech: (P) Clear (04/27/18 0840)  LUE Motor Response: (P) Spontaneous  (04/27/18 0840)  LLE Motor Response: (P) Spontaneous  (04/27/18 0840)  RUE Motor Response: (P) Spontaneous  (04/27/18 0840)  RLE Motor Response: (P) Spontaneous  (04/27/18 0840)   At baseline    Mental Status and Level of Consciousness: Arousable    Pulmonary Status:   O2 Device: Nasal cannula (04/27/18 0843)   Adequate oxygenation and airway patent    Complications related to anesthesia: None    Post-anesthesia assessment completed.  No concerns    Signed By: Michaelle Wilson MD     April 27, 2018

## 2018-04-27 NOTE — BRIEF OP NOTE
BRIEF OPERATIVE NOTE    Date of Procedure: 4/27/2018   Preoperative Diagnosis: HEMORRHOIDS  Postoperative Diagnosis: HEMORRHOIDS    Procedure(s):   HEMORRHOIDECTOMY   Surgeon(s) and Role:     * Lindsey Camargo MD - Primary         Surgical Assistant:     Surgical Staff:  Circ-1: Nabor Sumner RN  Circ-Intern: Jonnie Damon RN  Scrub Tech-1: Margarette Arana  Scrub Tech-2: Reyna [facility]  Surg Asst-1: Dotty Host  Event Time In   Incision Start 0802   Incision Close 0828     Anesthesia: General   Estimated Blood Loss: 5cc  Specimens:   ID Type Source Tests Collected by Time Destination   1 : Left Lateral Hemorrhoid Preservative Rectum  Lindsey Camargo MD 4/27/2018 7567 Pathology   2 : Right Posterior hemorrhoid  Preservative Rectum  Lindsey Camargo MD 4/27/2018 0312 Pathology      Findings: grade 3 prolapsed hemorrhoids x2   Complications: none apparent  Implants: * No implants in log *

## 2018-04-27 NOTE — PERIOP NOTES
0647 Son in to see patient and hear instructions. Pt given discharge instructions by blue phone  Esteban Rollins [phone]. Pt verbalized understanding. Son verbalized understanding. 21  PT reports unreleved pain after percocet. Dr Anamaria Perry notified. Fentanyl and Toredol given as ordered with pain relief per pt.

## 2018-05-10 ENCOUNTER — HOSPITAL ENCOUNTER (OUTPATIENT)
Dept: CT IMAGING | Age: 53
Discharge: HOME | End: 2018-05-10
Attending: INTERNAL MEDICINE
Payer: SUBSIDIZED

## 2018-05-10 DIAGNOSIS — R93.89 ABNORMAL CHEST X-RAY: ICD-10-CM

## 2018-05-10 PROCEDURE — 74011250636 HC RX REV CODE- 250/636: Performed by: INTERNAL MEDICINE

## 2018-05-10 PROCEDURE — 74011636320 HC RX REV CODE- 636/320: Performed by: INTERNAL MEDICINE

## 2018-05-10 PROCEDURE — 71260 CT THORAX DX C+: CPT

## 2018-05-10 RX ORDER — SODIUM CHLORIDE 0.9 % (FLUSH) 0.9 %
10 SYRINGE (ML) INJECTION
Status: COMPLETED | OUTPATIENT
Start: 2018-05-10 | End: 2018-05-10

## 2018-05-10 RX ORDER — SODIUM CHLORIDE 9 MG/ML
50 INJECTION, SOLUTION INTRAVENOUS
Status: COMPLETED | OUTPATIENT
Start: 2018-05-10 | End: 2018-05-10

## 2018-05-10 RX ADMIN — IOPAMIDOL 100 ML: 755 INJECTION, SOLUTION INTRAVENOUS at 10:04

## 2018-05-10 RX ADMIN — SODIUM CHLORIDE 50 ML/HR: 900 INJECTION, SOLUTION INTRAVENOUS at 10:04

## 2018-05-10 RX ADMIN — Medication 10 ML: at 10:04

## 2018-12-05 ENCOUNTER — HOSPITAL ENCOUNTER (OUTPATIENT)
Dept: NEUROLOGY | Age: 53
Discharge: HOME | End: 2018-12-05
Attending: INTERNAL MEDICINE
Payer: SUBSIDIZED

## 2018-12-05 DIAGNOSIS — R56.9 SEIZURE-LIKE ACTIVITY (HCC): ICD-10-CM

## 2018-12-05 DIAGNOSIS — G47.00 INSOMNIA WITH SLEEP APNEA: ICD-10-CM

## 2018-12-05 DIAGNOSIS — G47.30 INSOMNIA WITH SLEEP APNEA: ICD-10-CM

## 2018-12-05 PROCEDURE — 95819 EEG AWAKE AND ASLEEP: CPT

## 2018-12-07 NOTE — PROCEDURES
[address]  EEG    Sweta Brower  MR#: [id number]  : 1965  ACCOUNT #: [de-identified]   DATE OF SERVICE: 2018    REQUESTING PHYSICIAN:  Carin Colvin MD    HISTORY:  The patient is a 80-year-old male who is being evaluated for issues with sleeping and to rule out underlying subclinical seizure activity. DESCRIPTION:  This is an 18-channel EEG performed on an awake, drowsy, and asleep patient. During wakefulness, the dominant posterior background rhythm consists of symmetric, very well-modulated, medium-voltage rhythm in the 9-10 Hz frequency range out of the posterior head region. Eye blink artifact and faster frequencies are seen in the frontal head region. Drowsiness is characterized by slowing and vertex waves in the central area. Stage II non-REM sleep reveals symmetric sleep spindles. Photic stimulation elicits a symmetric driving response. Hyperventilation did not produce any abnormalities. ELECTROENCEPHALOGRAM SUMMARY:  Normal study. CLINICAL INTERPRETATION:  This was a normal EEG during awake, drowsy, and asleep states of the patient. No lateralizing or epileptiform features were noted.       MD LIZA Mcdermott / CAMILA  D: 2018 11:09     T: 2018 12:45  JOB #: [id number]

## 2019-05-30 ENCOUNTER — HOSPITAL ENCOUNTER (OUTPATIENT)
Dept: LAB | Age: 54
Discharge: HOME | End: 2019-05-30

## 2019-05-30 LAB
COMMENT, HOLDF: NORMAL
PSA SERPL-MCNC: 6.2 NG/ML (ref 0.01–4)
SAMPLES BEING HELD,HOLD: NORMAL
T4 FREE SERPL-MCNC: 0.9 NG/DL (ref 0.8–1.5)
TSH SERPL DL<=0.05 MIU/L-ACNC: 3.7 UIU/ML (ref 0.36–3.74)

## 2019-05-30 PROCEDURE — 84439 ASSAY OF FREE THYROXINE: CPT

## 2019-05-30 PROCEDURE — 84153 ASSAY OF PSA TOTAL: CPT

## 2019-05-30 PROCEDURE — 84443 ASSAY THYROID STIM HORMONE: CPT

## 2019-06-24 ENCOUNTER — HOSPITAL ENCOUNTER (OUTPATIENT)
Dept: LAB | Age: 54
Discharge: HOME | End: 2019-06-24

## 2019-06-24 LAB — PSA SERPL-MCNC: 7 NG/ML (ref 0.01–4)

## 2019-06-24 PROCEDURE — 84153 ASSAY OF PSA TOTAL: CPT

## 2019-11-25 ENCOUNTER — TELEPHONE (OUTPATIENT)
Dept: SLEEP MEDICINE | Age: 54
End: 2019-11-25

## 2019-11-25 DIAGNOSIS — G47.33 OSA (OBSTRUCTIVE SLEEP APNEA): Primary | ICD-10-CM

## 2019-11-25 NOTE — TELEPHONE ENCOUNTER
STUDY ORDER CONFIRMATION    Study Indication:   G47.50  Study: Split Night Polysomnogram - CPT 53153: First prtion of the study is diagnostic, CPAP, BiPAPor ASV (as warranted) is titrated if RAMÓN meets criteria. Schedule for second night study if split night was not possible, or optimal pressure could not be determined during the intital study.   Study Instructions / additional orders:

## 2019-11-25 NOTE — TELEPHONE ENCOUNTER
STUDY ORDER CONFIRMATION  Krystle Lewis 1965      Type of Study Requested: Sleep Medicine Consult - Schedule patient for a sleep specialist consult. If appropriate, schedule patient for sleep study(s). Initiate treatment if needed. Forward correspondance to my office. Referring Physician: Dr. Sandy Ureña    Date of Study: 2/10/20  Spoke with: Bienvenido Burnett @ Dr. Mariia Luz office         Height: 70\"  Weight: 212lbs  (over 499 lb can only be done at [facility])  BMI: 30.42      Snoring      yes    Excessive Daytime Sleepiness   no    Witnessed Apnea     no    Hypertension      yes    Cardiovascular disease (CHF-Heart Failure)  yes    COPD or Emphysema?    no  On Oxygen?  lpm Day/Night   no    Restless Leg Symptoms-   Do you experience an uncomfortable sensation in your legs   especially at night?    no   Kicking?     no   Twitching?     no    Do you experience insomnia? yes  Sleep talking/walking?     no  Do you ever wake with a rapid heart rate?  no  Unusual movements in sleep (violent, flailing limbs? )no  Night Terrors?      no  Sleep Paralysis or Sleep Hallucinations:  (while waking from sleep or dream, you cannot move) no  Do you/have you had seizures?   no  Have you had a Stroke, CVA or TIA?   no       When? Do you take any medications for the following? Pain       no  Anxiety       no  Sleep       no               Have you been in hospital?    no   Has it been at least 72 hrs. since discharge? no   Discharge Date    (If not at least 72 hrs, cannot see pt. for study)    Are you currently on an antibiotic?   no  If yes, for what reason? Do you need anything from us for your employer? no    Are you a CDL, DOT or other Certified ? no     Have you had a sleep study before?   yes  If yes, when and where? Sleep Deprived EEG @ Hospital  Are you currently using CPAP?   no  If yes, what is the setting? Do you have any special needs? Wheelchair      no  Help to and from the bathroom   no  Other? no    (If yes to any special needs a care giver may need to come with the patient and stay the night.)    Will someone need to accompany you on the night of your study? (Primarily for parents of minors, patients with special needs, elderly, long distance travel). Other family,  may stay until study begins. \"We want to be sure to take the best care of you. Are there Cultural or Spiritual needs that we should be aware of or are there any concerns that I can address for you?        no    If yes, describe:       Attach Medication List     If yes to any \"*\" or special needs, discuss with the Lead Tech    Notes:  Sami Speaking Patient    Study date:2/10/20  Time:830pm Location:[facility]      Compiled by:      Date: 11/25/19

## 2020-02-18 ENCOUNTER — HOSPITAL ENCOUNTER (OUTPATIENT)
Dept: SLEEP MEDICINE | Age: 55
Discharge: HOME | End: 2020-02-18
Payer: SUBSIDIZED

## 2020-02-18 VITALS
WEIGHT: 208.2 LBS | OXYGEN SATURATION: 95 % | BODY MASS INDEX: 29.81 KG/M2 | SYSTOLIC BLOOD PRESSURE: 130 MMHG | TEMPERATURE: 98.2 F | DIASTOLIC BLOOD PRESSURE: 83 MMHG | HEIGHT: 70 IN

## 2020-02-18 DIAGNOSIS — G47.33 OSA (OBSTRUCTIVE SLEEP APNEA): ICD-10-CM

## 2020-02-18 PROCEDURE — 95810 POLYSOM 6/> YRS 4/> PARAM: CPT | Performed by: INTERNAL MEDICINE

## 2020-02-29 ENCOUNTER — TELEPHONE (OUTPATIENT)
Dept: SLEEP MEDICINE | Age: 55
End: 2020-02-29

## 2020-03-17 ENCOUNTER — OFFICE VISIT (OUTPATIENT)
Dept: SLEEP MEDICINE | Age: 55
End: 2020-03-17

## 2020-03-17 VITALS
TEMPERATURE: 98.1 F | HEART RATE: 60 BPM | WEIGHT: 211.1 LBS | SYSTOLIC BLOOD PRESSURE: 137 MMHG | DIASTOLIC BLOOD PRESSURE: 83 MMHG | BODY MASS INDEX: 30.22 KG/M2 | HEIGHT: 70 IN | OXYGEN SATURATION: 96 %

## 2020-03-17 DIAGNOSIS — G47.33 OSA (OBSTRUCTIVE SLEEP APNEA): Primary | ICD-10-CM

## 2020-03-17 DIAGNOSIS — I10 ESSENTIAL HYPERTENSION: ICD-10-CM

## 2020-03-17 NOTE — PROGRESS NOTES
[address] [facility]., [place]. [place], [address]  Tel.  [phone]  Fax. 100 [facility] 60  [place], [address] [facility]  Tel.  [phone]  Fax. [phone] [address]   Tel.  [phone]  Fax. [phone]         Subjective:      Mary Kruse is an 54 y.o. male referred for evaluation for a sleep disorder. He complains of snoring associated with periods of not breathing, poor quality sleep and excessive daytime sleepiness. Symptoms began 8 years ago, gradually worsening since that time. He usually can fall asleep in 60 minutes. Family or house members note snoring, periods of not breathing. He reports of feeling completely or partially paralyzed while falling asleep or waking up. Mary Kruse does not wake up frequently at night. He is bothered by waking up too early and left unable to get back to sleep. He actually sleeps about 4-5 hours at night and wakes up about 1-2 times during the night. He does not work shifts:  .   Isai Maximo indicates he does get too little sleep at night. His bedtime is 11:00 pm or midnight. He awakens at 7:00 am. He does not take naps. Other remarks:   Polysomnogram was performed and the results of the study were explained to the patient. Please refer to interpretation report for further details. Apnea/Hypopnea index of 16 which indicates mild apnea. [place] Sleepiness Score: 15 which reflect severe daytime drowsiness. Allergies   Allergen Reactions    Amoxicillin Other (comments)     Chest pain         Current Outpatient Medications:     oxyCODONE-acetaminophen (PERCOCET) 5-325 mg per tablet, Take 1 Tab by mouth every four (4) hours as needed for Pain. Max Daily Amount: 6 Tabs., Disp: 60 Tab, Rfl: 0    losartan (COZAAR) 100 mg tablet, Take 1 Tab by mouth daily.  mike 1 tab por la boca diaria, Disp: 90 Tab, Rfl: 1    carvedilol (COREG) 25 mg tablet, Take 1 Tab by mouth two (2) times daily (with meals). mike 1 tab por la boca 2 veces al edith, Disp: 180 Tab, Rfl: 1    aspirin 81 mg chewable tablet, Take 1 Tab by mouth daily. , Disp: 90 Tab, Rfl: 1    ibuprofen (ADVIL) 200 mg tablet, Take 200 mg by mouth., Disp: , Rfl:     magnesium oxide 400 mg cap, Take 1 Tab by mouth as needed. , Disp: , Rfl:     multivitamin (ONE A DAY) tablet, Take 1 Tab by mouth as needed. , Disp: , Rfl:      He  has a past medical history of Chest pain (7/2/2015), HTN (hypertension), and Supraventricular arrhythmia (2006). He  has a past surgical history that includes hx cholecystectomy (2013); hx heent (Left); hx colonoscopy (08/2017); and hx endoscopy (08/2017). He family history includes Cancer in his father; Rafiq Arn in his father; Heart Disease in his mother. He  reports that he quit smoking about 11 years ago. He has never used smokeless tobacco. He reports that he does not drink alcohol or use drugs. Review of Systems:  Constitutional:  No significant weight loss or weight gain. Eyes:  No blurred vision. CVS:  No significant chest pain  Pulm:  No significant shortness of breath  GI:  No significant nausea or vomiting  :  + significant nocturia  Musculoskeletal:  + significant joint pain at night  Skin:  No significant rashes  Neuro:  + significant dizziness   Psych:  + active mood issues    Sleep Review of Systems: notable for no difficulty falling asleep; infrequent awakenings at night;  regular dreaming noted; nightmares ; early morning headaches; memory problems; concentration issues; no history of any automobile or occupational accidents due to daytime drowsiness.       Objective:     Visit Vitals  /83   Pulse 60   Temp 98.1 °F (36.7 °C)   Ht 5' 10\" (1.778 m)   Wt 211 lb 1.6 oz (95.8 kg)   SpO2 96%   BMI 30.29 kg/m²         General:   Not in acute distress   Eyes:  Anicteric sclerae, no obvious strabismus   Nose:  No obvious nasal septum deviation    Oropharynx:   Class 4 oropharyngeal outlet, thick tongue base, uvula could not be seen due to low-lying soft palate, narrow tonsilo-pharyngeal pilars   Tonsils:   tonsils are not seen due to low-lying soft palate   Neck:    ; midline trachea   Chest/Lungs:  Equal lung expansion, clear on auscultation    CVS:  Normal rate, regular rhythm; no JVD   Skin:  Warm to touch; no obvious rashes   Neuro:  No focal deficits ; no obvious tremor    Psych:  Normal affect,  normal countenance;          Assessment:       ICD-10-CM ICD-9-CM    1. RAMÓN (obstructive sleep apnea) G47.33 327.23 SLEEP LAB (PAP TITRATION)   2. Essential hypertension I10 401.9    3. BMI 30.0-30.9,adult Z68.30 V85.30          Plan:     *Raheem Oswald was provided information on sleep apnea including coresponding risk factors and the importance of proper treatment. * Treatment options were offered. Patient has elected to proceed with a positive airway pressure trial (CPAP). * A second polysomnogram was ordered for mask fitting, PAP desensitizing protocol, and pressure titration. * Follow-up appointment was scheduled 6-8 weeks following PAP initiation to gauge treatment response and compliance. * Counseling was provided regarding the importance of regular PAP therapy. * We have recommended a dedicated weight loss and exercise regiment as significant weight reduction has been shown to reduce severity of obstructive sleep apnea. * Counseling was provided regarding safe driving and proper sleep hygiene, with particular attention to maintaining lateral positioning while sleeping with the use of bed pillows to reduce apnic events at night. * Patient was asked to contact our office at any time for further questions regarding their sleep symptoms. Thank you for allowing us to participate in your patient's medical care. We'll keep you updated on these investigations. Michael Chen MD, FAASM  Electronically signed.  03/17/20

## 2020-03-17 NOTE — PATIENT INSTRUCTIONS
217 [facility]., [place]. [address] [facility], [address] Tel.  [phone] Fax. 100 [facility] 60 [address] [facility] [address], [address] Tel.  [phone] Fax. [phone] [address] Tel.  [phone] Fax. [phone] Sleep Apnea: After Your Visit Your Care Instructions Sleep apnea occurs when you frequently stop breathing for 10 seconds or longer during sleep. It can be mild to severe, based on the number of times per hour that you stop breathing or have slowed breathing. Blocked or narrowed airways in your nose, mouth, or throat can cause sleep apnea. Your airway can become blocked when your throat muscles and tongue relax during sleep. Sleep apnea is common, occurring in 1 out of 20 individuals. Individuals having any of the following characteristics should be evaluated and treated right away due to high risk and detrimental consequences from untreated sleep apnea: 
1. Obesity 2. Congestive Heart failure 3. Atrial Fibrillation 4. Uncontrolled Hypertension 5. Type II Diabetes 6. Night-time Arrhythmias 7. Stroke 8. Pulmonary Hypertension 9. High-risk Driving Populations (pilots, truck drivers, etc.) 10. Patients Considering Weight-loss Surgery How do you know you have sleep apnea? You probably have sleep apnea if you answer 'yes' to 3 or more of the following questions: S - Have you been told that you Snore? T - Are you often Tired during the day? O - Has anyone Observed you stop breathing while sleeping? P- Do you have (or are being treated for) high blood Pressure? B - Are you obese (Body Mass Index > 35)? A - Is your Age 48years old or older? N - Is your Neck size greater than 16 inches? G - Are you male Gender? A sleep physician can prescribe a breathing device that prevents tissues in the throat from blocking your airway.  Or your doctor may recommend using a dental device (oral breathing device) to help keep your airway open. In some cases, surgery may be needed to remove enlarged tissues in the throat. Follow-up care is a key part of your treatment and safety. Be sure to make and go to all appointments, and call your doctor if you are having problems. It's also a good idea to know your test results and keep a list of the medicines you take. How can you care for yourself at home? · Lose weight, if needed. It may reduce the number of times you stop breathing or have slowed breathing. · Go to bed at the same time every night. · Sleep on your side. It may stop mild apnea. If you tend to roll onto your back, sew a pocket in the back of your pajama top. Put a tennis ball into the pocket, and stitch the pocket shut. This will help keep you from sleeping on your back. · Avoid alcohol and medicines such as sleeping pills and sedatives before bed. · Do not smoke. Smoking can make sleep apnea worse. If you need help quitting, talk to your doctor about stop-smoking programs and medicines. These can increase your chances of quitting for good. · Prop up the head of your bed 4 to 6 inches by putting bricks under the legs of the bed. · Treat breathing problems, such as a stuffy nose, caused by a cold or allergies. · Use a continuous positive airway pressure (CPAP) breathing machine if lifestyle changes do not help your apnea and your doctor recommends it. The machine keeps your airway from closing when you sleep. · If CPAP does not help you, ask your doctor whether you should try other breathing machines. A bilevel positive airway pressure machine has two types of air pressureâone for breathing in and one for breathing out. Another device raises or lowers air pressure as needed while you breathe. · If your nose feels dry or bleeds when using one of these machines, talk with your doctor about increasing moisture in the air. A humidifier may help.  
· If your nose is runny or stuffy from using a breathing machine, talk with your doctor about using decongestants or a corticosteroid nasal spray. When should you call for help? Watch closely for changes in your health, and be sure to contact your doctor if: 
· You still have sleep apnea even though you have made lifestyle changes. · You are thinking of trying a device such as CPAP. · You are having problems using a CPAP or similar machine. Where can you learn more? Go to [employer]. Enter X983 in the search box to learn more about \"Sleep Apnea: After Your Visit. \"  
© 9454-9282 Healthwise, Incorporated. Care instructions adapted under license by [employer] (which disclaims liability or warranty for this information). This care instruction is for use with your licensed healthcare professional. If you have questions about a medical condition or this instruction, always ask your healthcare professional. Donald Phelps any warranty or liability for your use of this information. PROPER SLEEP HYGIENE What to avoid · Do not have drinks with caffeine, such as coffee or black tea, for 8 hours before bed. · Do not smoke or use other types of tobacco near bedtime. Nicotine is a stimulant and can keep you awake. · Avoid drinking alcohol late in the evening, because it can cause you to wake in the middle of the night. · Do not eat a big meal close to bedtime. If you are hungry, eat a light snack. · Do not drink a lot of water close to bedtime, because the need to urinate may wake you up during the night. · Do not read or watch TV in bed. Use the bed only for sleeping and sexual activity. What to try · Go to bed at the same time every night, and wake up at the same time every morning. Do not take naps during the day. · Keep your bedroom quiet, dark, and cool. · Get regular exercise, but not within 3 to 4 hours of your bedtime. Nella Rangel · Sleep on a comfortable pillow and mattress. · If watching the clock makes you anxious, turn it facing away from you so you cannot see the time. · If you worry when you lie down, start a worry book. Well before bedtime, write down your worries, and then set the book and your concerns aside. · Try meditation or other relaxation techniques before you go to bed. · If you cannot fall asleep, get up and go to another room until you feel sleepy. Do something relaxing. Repeat your bedtime routine before you go to bed again. · Make your house quiet and calm about an hour before bedtime. Turn down the lights, turn off the TV, log off the computer, and turn down the volume on music. This can help you relax after a busy day. Drowsy Driving The [employer] cites drowsiness as a causing factor in more than 143,480 police reported crashes annually, resulting in 76,000 injuries and 1,500 deaths. Other surveys suggest 55% of people polled have driven while drowsy in the past year, 23% had fallen asleep but not crashed, 3% crashed, and 2% had and accident due to drowsy driving. Who is at risk? Young Drivers: One study of drowsy driving accidents states that 55% of the drivers were under 25 years. Of those, 75% were male. Shift Workers and Travelers: People who work overnight or travel across time zones frequently are at higher risk of experiencing Circadian Rhythm Disorders. They are trying to work and function when their body is programed to sleep. Sleep Deprived: Lack of sleep has a serious impact on your ability to pay attention or focus on a task. Consistently getting less than the average of 8 hours your body needs creates partial or cumulative sleep deprivation. Untreated Sleep Disorders: Sleep Apnea, Narcolepsy, R.L.S., and other sleep disorders (untreated) prevent a person from getting enough restful sleep.  This leads to excessive daytime sleepiness and increases the risk for drowsy driving accidents by up to 7 times. Medications / Alcohol: Even over the counter medications can cause drowsiness. Medications that impair a drivers attention should have a warning label. Alcohol naturally makes you sleepy and on its own can cause accidents. Combined with excessive drowsiness its effects are amplified. Signs of Drowsy Driving: * You don't remember driving the last few miles * You may drift out of your christina * You are unable to focus and your thoughts wander * You may yawn more often than normal 
 * You have difficulty keeping your eyes open / nodding off * Missing traffic signs, speeding, or tailgating Prevention-  
Good sleep hygiene, lifestyle and behavioral choices have the most impact on drowsy driving. There is no substitute for sleep and the average person requires 8 hours nightly. If you find yourself driving drowsy, stop and sleep. Consider the sleep hygiene tips provided during your visit as well. Medication Refill Policy: Refills for all medications require 1 week advance notice. Please have your pharmacy fax a refill request. We are unable to fax, or call in \"controled substance\" medications and you will need to pick these prescriptions up from our office. Mobio Activation Thank you for requesting access to Mobio. Please follow the instructions below to securely access and download your online medical record. Mobio allows you to send messages to your doctor, view your test results, renew your prescriptions, schedule appointments, and more. How Do I Sign Up? 1. In your internet browser, go to [email or web address]. 2. Click on the First Time User? Click Here link in the Sign In box. You will see the New Member Sign Up page. 3. Enter your Mobio Access Code exactly as it appears below. You will not need to use this code after youve completed the sign-up process.  If you do not sign up before the expiration date, you must request a new code. VAZATA Access Code: [email or web address] Expires: 2020  2:15 PM (This is the date your VAZATA access code will ) 4. Enter the last four digits of your Social Security Number (xxxx) and Date of Birth (mm/dd/yyyy) as indicated and click Submit. You will be taken to the next sign-up page. 5. Create a VAZATA ID. This will be your VAZATA login ID and cannot be changed, so think of one that is secure and easy to remember. 6. Create a VAZATA password. You can change your password at any time. 7. Enter your Password Reset Question and Answer. This can be used at a later time if you forget your password. 8. Enter your e-mail address. You will receive e-mail notification when new information is available in [address]. 9. Click Sign Up. You can now view and download portions of your medical record. 10. Click the Download Summary menu link to download a portable copy of your medical information. Additional Information If you have questions, please call [phone]. Remember, VAZATA is NOT to be used for urgent needs. For medical emergencies, dial 911.

## 2020-03-17 NOTE — LETTER
3/17/20 Patient: Blaze Sanabria YOB: 1965 Date of Visit: 3/17/2020 Keron Lakhani MD 
0 [facility] [address] [place] [address] [id number] VIA In Basket Dear Keron Lakhani MD, Thank you for referring Mr. Blaze Sanabria to [place] [address] for evaluation. My notes for this consultation are attached. If you have questions, please do not hesitate to call me. I look forward to following your patient along with you. Sincerely, Pascual Del Toro MD

## 2020-04-22 ENCOUNTER — TELEPHONE (OUTPATIENT)
Dept: SLEEP MEDICINE | Age: 55
End: 2020-04-22

## 2020-04-22 DIAGNOSIS — G47.33 OSA (OBSTRUCTIVE SLEEP APNEA): Primary | ICD-10-CM

## 2020-04-23 NOTE — TELEPHONE ENCOUNTER
Patient uninsured. Given the names and numbers of DME companies to find out which one will give him the best price.   Adherence scheduled

## 2020-04-23 NOTE — TELEPHONE ENCOUNTER
Orders Placed This Encounter    AMB SUPPLY ORDER     Diagnosis: (G47.33) RAMÓN (obstructive sleep apnea)  (primary encounter diagnosis)     Respironics DreamStation ( Unit - Auto Mode) / Heated Humidifier :    Positive Airway Pressure Therapy: Duration of need: 99 months. Auto - PAP: 4 - 20 cmH2O; Optistart enabled. Ramp Time: 30 Minutes; Flex: 2. Remote monitoring enrollment.  Nasal Cushion (Replace) 2 per month.  Nasal Interface Mask 1 every 3 months.  Headgear 1 every 6 months.  Filter(s) Disposable 2 per month.  Filter(s) Non-Disposable 1 every 6 months. [address] for Locked Twan (Replace) 1 every 6 months.  Tubing with heating element 1 every 3 months. Perform Mask Fitting per patient preference and comfort - replace as above. Abilio Lang MD, FAASM; NPI: [id number]  Electronically signed. 04/23/20

## 2020-07-22 ENCOUNTER — TELEPHONE (OUTPATIENT)
Dept: SLEEP MEDICINE | Age: 55
End: 2020-07-22

## 2020-07-22 NOTE — TELEPHONE ENCOUNTER
Called patient and spoke with daughter who interprets for him concerning if he was set up with PAP machine. He has not as he can't afford. Spoke with daughter about CPAP Assist program for patient, however, due to COVID-19, this program has been suspended at this time.

## 2020-07-28 ENCOUNTER — TELEPHONE (OUTPATIENT)
Dept: SLEEP MEDICINE | Age: 55
End: 2020-07-28

## 2021-02-04 PROBLEM — Z00.00 ENCOUNTER FOR PREVENTIVE HEALTH EXAMINATION: Status: ACTIVE | Noted: 2021-02-04

## 2021-02-05 ENCOUNTER — APPOINTMENT (OUTPATIENT)
Dept: PULMONOLOGY | Facility: CLINIC | Age: 56
End: 2021-02-05
Payer: MEDICAID

## 2021-02-05 ENCOUNTER — TELEPHONE (OUTPATIENT)
Dept: SLEEP MEDICINE | Age: 56
End: 2021-02-05

## 2021-02-05 VITALS
RESPIRATION RATE: 16 BRPM | HEIGHT: 69.25 IN | OXYGEN SATURATION: 98 % | WEIGHT: 198 LBS | SYSTOLIC BLOOD PRESSURE: 142 MMHG | BODY MASS INDEX: 28.99 KG/M2 | DIASTOLIC BLOOD PRESSURE: 84 MMHG | TEMPERATURE: 98.1 F | HEART RATE: 66 BPM

## 2021-02-05 DIAGNOSIS — Z78.9 OTHER SPECIFIED HEALTH STATUS: ICD-10-CM

## 2021-02-05 DIAGNOSIS — Z82.49 FAMILY HISTORY OF ISCHEMIC HEART DISEASE AND OTHER DISEASES OF THE CIRCULATORY SYSTEM: ICD-10-CM

## 2021-02-05 DIAGNOSIS — R79.89 OTHER SPECIFIED ABNORMAL FINDINGS OF BLOOD CHEMISTRY: ICD-10-CM

## 2021-02-05 DIAGNOSIS — Z86.79 PERSONAL HISTORY OF OTHER DISEASES OF THE CIRCULATORY SYSTEM: ICD-10-CM

## 2021-02-05 DIAGNOSIS — Z80.0 FAMILY HISTORY OF MALIGNANT NEOPLASM OF DIGESTIVE ORGANS: ICD-10-CM

## 2021-02-05 DIAGNOSIS — Z63.5 DISRUPTION OF FAMILY BY SEPARATION AND DIVORCE: ICD-10-CM

## 2021-02-05 DIAGNOSIS — Z87.438 PERSONAL HISTORY OF OTHER DISEASES OF MALE GENITAL ORGANS: ICD-10-CM

## 2021-02-05 DIAGNOSIS — Z56.0 UNEMPLOYMENT, UNSPECIFIED: ICD-10-CM

## 2021-02-05 PROCEDURE — 99072 ADDL SUPL MATRL&STAF TM PHE: CPT

## 2021-02-05 PROCEDURE — 99205 OFFICE O/P NEW HI 60 MIN: CPT

## 2021-02-05 RX ORDER — CARVEDILOL 25 MG/1
25 TABLET, FILM COATED ORAL
Refills: 0 | Status: ACTIVE | COMMUNITY

## 2021-02-05 RX ORDER — ERGOCALCIFEROL (VITAMIN D2) 1250 MCG
50000 CAPSULE ORAL
Refills: 0 | Status: ACTIVE | COMMUNITY

## 2021-02-05 RX ORDER — ASCORBIC ACID 500 MG
TABLET ORAL
Refills: 0 | Status: ACTIVE | COMMUNITY

## 2021-02-05 RX ORDER — LOSARTAN POTASSIUM 100 MG/1
100 TABLET, FILM COATED ORAL
Refills: 0 | Status: ACTIVE | COMMUNITY

## 2021-02-05 SDOH — ECONOMIC STABILITY - INCOME SECURITY: UNEMPLOYMENT, UNSPECIFIED: Z56.0

## 2021-02-05 SDOH — SOCIAL STABILITY - SOCIAL INSECURITY: DISRUPTION OF FAMILY BY SEPARATION AND DIVORCE: Z63.5

## 2021-02-05 NOTE — REASON FOR VISIT
[Initial] : an initial visit [Asthma] : asthma [Sleep Apnea] : sleep apnea [Shortness of Breath] : shortness of breath [[employer] ] : provided by [employer]   [FreeTextEntry1] : [id number] [FreeTextEntry2] : Hakeem [TWNoteComboBox1] : Rwandan

## 2021-02-05 NOTE — DISCUSSION/SUMMARY
[FreeTextEntry1] : \par #1. Will schedule PFTs in near future to assess lung function with Covid testing prior to PFTs\par #2. The patient does not appear to require chronic BD therapy at this time\par #3. Diet and exercise for weight loss\par #4. SOBOE is likely related to weight or deconditioning\par #5. PSG to evaluate for possible JEY given underlying arrhythmia. \par #6. CXR to evaluate SOBOE and ? h/o nodules\par #7. ENT evaluation for chronic sinusitis\par #8. Cardiology evaluation for h/o arrhythmias\par #9. Flonase nasal spray for post nasal drip as needed; on Claritin for allergies\par #10. F/u after PSG with CXR and PFTs\par #11. Reviewed risks of exposure and symptoms of Covid-19 virus, including how the virus is spread and precautions to avoid allen virus.\par \par Patient's questions were answered and patient appears to understand these recommendations

## 2021-02-05 NOTE — TELEPHONE ENCOUNTER
Dimple Joseph (Dr. Jeannette Armstrong office at the [facility]) called wanted a copy of sleep study results. Informed Dimple Joseph , we need a Release of Records to be signed and faxed to us.

## 2021-02-05 NOTE — PHYSICAL EXAM
[No Acute Distress] : no acute distress [Well Nourished] : well nourished [Well Developed] : well developed [Normal Appearance] : normal appearance [Supple] : supple [Normal Rate/Rhythm] : normal rate/rhythm [Normal S1, S2] : normal s1, s2 [No Murmurs] : no murmurs [No Resp Distress] : no resp distress [No Acc Muscle Use] : no acc muscle use [Normal Rhythm and Effort] : normal rhythm and effort [Clear to Auscultation Bilaterally] : clear to auscultation bilaterally [No Abnormalities] : no abnormalities [Benign] : benign [Soft] : soft [No Clubbing] : no clubbing [No Edema] : no edema [No Focal Deficits] : no focal deficits [Oriented x3] : oriented x3 [TextBox_2] : Pt is overweight

## 2021-02-05 NOTE — CONSULT LETTER
[Dear  ___] : Dear  [unfilled], [Consult Letter:] : I had the pleasure of evaluating your patient, [unfilled]. [Please see my note below.] : Please see my note below. [Consult Closing:] : Thank you very much for allowing me to participate in the care of this patient.  If you have any questions, please do not hesitate to contact me. [Sincerely,] : Sincerely, [FreeTextEntry3] : Brian Lopez MD, FCCP, D. ABSM\par Pulmonary and Sleep Medicine\par [facility] at [place]

## 2021-02-05 NOTE — HISTORY OF PRESENT ILLNESS
[Initial Evaluation] : an initial evaluation of [Excessive Daytime Sleepiness] : excessive daytime sleepiness [Witnessed Apnea During Sleep] : witnessed apnea during sleep [Witnessed Gasping During Sleep] : witnessed gasping during sleep [Snoring] : snoring [Unrefreshing Sleep] : unrefreshing sleep [Sleepy When Sedentary] : sleepy when sedentary [X-Ray] : an X-Ray [Initial Eval - Existing Diagnosis] : an initial evaluation of an existing diagnosis of [Intermittent] : intermittent [Dyspnea on Exertion] : dyspnea on exertion [None] : The patient is not currently being treated for this problem [Former] : former [Never] : never [TextBox_4] : Patient c/o SOBOE but is otherwise without associated respiratory complaints.\par He reports chronic sinusitis and rhinitis.\par He admits to being dx'd with asthma in [place].\par Smoked 6-8 cigarettes daily x 2 years but one cigarette daily for 10 years, quit 2009.\par He reports mild chest discomfort and palpitations and was seen by cardiology in [place] and was started on Carvedilol.

## 2021-02-05 NOTE — REVIEW OF SYSTEMS
[SOB on Exertion] : sob on exertion [Seasonal Allergies] : seasonal allergies [Fever] : no fever [Chills] : no chills [Nasal Congestion] : no nasal congestion [Postnasal Drip] : no postnasal drip [Sinus Problems] : no sinus problems [Cough] : no cough [Chest Tightness] : no chest tightness [Sputum] : no sputum [Dyspnea] : no dyspnea [Pleuritic Pain] : no pleuritic pain [Wheezing] : no wheezing [Chest Discomfort] : no chest discomfort [Edema] : no edema [Palpitations] : no palpitations [Syncope] : no syncope [Hay Fever] : no hay fever [GERD] : no gerd [Abdominal Pain] : no abdominal pain [Nausea] : no nausea [Vomiting] : no vomiting [Diarrhea] : no diarrhea [Constipation] : no constipation [Back Pain] : no back pain [Anemia] : no anemia [Headache] : no headache [Seizures] : no seizures [Dizziness] : no dizziness [Numbness] : no numbness [Paralysis] : no paralysis [Confusion] : no confusion [Depression] : no depression [Anxiety] : no anxiety [Diabetes] : no diabetes [Thyroid Problem] : no thyroid problem

## 2021-02-11 ENCOUNTER — APPOINTMENT (OUTPATIENT)
Dept: RADIOLOGY | Facility: CLINIC | Age: 56
End: 2021-02-11
Payer: MEDICAID

## 2021-02-11 ENCOUNTER — OUTPATIENT (OUTPATIENT)
Dept: OUTPATIENT SERVICES | Facility: HOSPITAL | Age: 56
LOS: 1 days | End: 2021-02-11
Payer: COMMERCIAL

## 2021-02-11 DIAGNOSIS — R06.02 SHORTNESS OF BREATH: ICD-10-CM

## 2021-02-11 PROCEDURE — 71046 X-RAY EXAM CHEST 2 VIEWS: CPT | Mod: 26

## 2021-02-11 PROCEDURE — 71046 X-RAY EXAM CHEST 2 VIEWS: CPT

## 2021-03-14 ENCOUNTER — OUTPATIENT (OUTPATIENT)
Dept: OUTPATIENT SERVICES | Facility: HOSPITAL | Age: 56
LOS: 1 days | End: 2021-03-14
Payer: COMMERCIAL

## 2021-03-14 DIAGNOSIS — G47.33 OBSTRUCTIVE SLEEP APNEA (ADULT) (PEDIATRIC): ICD-10-CM

## 2021-03-14 PROCEDURE — 95806 SLEEP STUDY UNATT&RESP EFFT: CPT | Mod: 26

## 2021-03-14 PROCEDURE — 95806 SLEEP STUDY UNATT&RESP EFFT: CPT

## 2021-04-01 ENCOUNTER — APPOINTMENT (OUTPATIENT)
Dept: PULMONOLOGY | Facility: CLINIC | Age: 56
End: 2021-04-01
Payer: MEDICAID

## 2021-04-01 VITALS
BODY MASS INDEX: 29.43 KG/M2 | TEMPERATURE: 98 F | OXYGEN SATURATION: 98 % | WEIGHT: 201 LBS | DIASTOLIC BLOOD PRESSURE: 80 MMHG | SYSTOLIC BLOOD PRESSURE: 140 MMHG | HEART RATE: 82 BPM | HEIGHT: 69.25 IN

## 2021-04-01 PROCEDURE — 99072 ADDL SUPL MATRL&STAF TM PHE: CPT

## 2021-04-01 PROCEDURE — 99214 OFFICE O/P EST MOD 30 MIN: CPT

## 2021-04-01 RX ORDER — FINASTERIDE 5 MG/1
5 TABLET, FILM COATED ORAL
Refills: 0 | Status: ACTIVE | COMMUNITY
Start: 2021-04-01

## 2021-04-01 NOTE — CONSULT LETTER
[Dear  ___] : Dear  [unfilled], [Consult Letter:] : I had the pleasure of evaluating your patient, [unfilled]. [Please see my note below.] : Please see my note below. [Consult Closing:] : Thank you very much for allowing me to participate in the care of this patient.  If you have any questions, please do not hesitate to contact me. [Sincerely,] : Sincerely, [FreeTextEntry3] : Brian Lopez MD, FCCP, D. ABSM\par Pulmonary and Sleep Medicine\par [facility] at [place]

## 2021-04-01 NOTE — HISTORY OF PRESENT ILLNESS
[Former] : former [Never] : never [Excessive Daytime Sleepiness] : excessive daytime sleepiness [Witnessed Apnea During Sleep] : witnessed apnea during sleep [Witnessed Gasping During Sleep] : witnessed gasping during sleep [Snoring] : snoring [Unrefreshing Sleep] : unrefreshing sleep [Sleepy When Sedentary] : sleepy when sedentary [X-Ray] : an X-Ray [Follow-Up - Routine Clinic] : a routine clinic follow-up of [Intermittent] : intermittent [Dyspnea on Exertion] : dyspnea on exertion [None] : The patient is not currently being treated for this problem [TextBox_4] : Patient c/o SOBOE but is otherwise without associated respiratory complaints.\par He reports chronic sinusitis and rhinitis.\par He admits to being dx'd with asthma in [place].\par Smoked 6-8 cigarettes daily x 2 years but one cigarette daily for 10 years, quit 2009.\par He reports mild chest discomfort and palpitations and was seen by cardiology in [place] and was started on Carvedilol.

## 2021-04-01 NOTE — REASON FOR VISIT
[Follow-Up] : a follow-up visit [Asthma] : asthma [Sleep Apnea] : sleep apnea [Shortness of Breath] : shortness of breath [[employer] ] : provided by [employer]   [FreeTextEntry1] : [id number] [FreeTextEntry2] : Shahab [TWNoteComboBox1] : American

## 2021-04-01 NOTE — PHYSICAL EXAM
[No Acute Distress] : no acute distress [Well Nourished] : well nourished [Well Developed] : well developed [Normal Appearance] : normal appearance [Normal Rate/Rhythm] : normal rate/rhythm [Supple] : supple [Normal S1, S2] : normal s1, s2 [No Murmurs] : no murmurs [No Resp Distress] : no resp distress [No Acc Muscle Use] : no acc muscle use [Normal Rhythm and Effort] : normal rhythm and effort [Clear to Auscultation Bilaterally] : clear to auscultation bilaterally [No Abnormalities] : no abnormalities [Benign] : benign [Soft] : soft [No Clubbing] : no clubbing [No Edema] : no edema [No Focal Deficits] : no focal deficits [Oriented x3] : oriented x3 [TextBox_2] : Pt is overweight

## 2021-06-02 ENCOUNTER — APPOINTMENT (OUTPATIENT)
Dept: PULMONOLOGY | Facility: CLINIC | Age: 56
End: 2021-06-02
Payer: MEDICAID

## 2021-06-02 VITALS
RESPIRATION RATE: 16 BRPM | BODY MASS INDEX: 29.03 KG/M2 | WEIGHT: 198 LBS | DIASTOLIC BLOOD PRESSURE: 80 MMHG | SYSTOLIC BLOOD PRESSURE: 140 MMHG | HEART RATE: 65 BPM | OXYGEN SATURATION: 96 %

## 2021-06-02 PROCEDURE — T1013: CPT

## 2021-06-02 PROCEDURE — 99214 OFFICE O/P EST MOD 30 MIN: CPT | Mod: 25

## 2021-06-02 PROCEDURE — 99072 ADDL SUPL MATRL&STAF TM PHE: CPT

## 2021-06-02 NOTE — HISTORY OF PRESENT ILLNESS
[Former] : former [Never] : never [X-Ray] : an X-Ray [Follow-Up - Routine Clinic] : a routine clinic follow-up of [Intermittent] : intermittent [Dyspnea on Exertion] : dyspnea on exertion [None] : The patient is not currently being treated for this problem [Good Compliance] : good compliance with treatment [Good Tolerance] : good tolerance of treatment [Good Symptom Control] : good symptom control [TextBox_4] : Patient c/o SOBOE but is otherwise without associated respiratory complaints.\par He reports chronic sinusitis and rhinitis.\par He admits to being dx'd with asthma in [place].\par Smoked 6-8 cigarettes daily x 2 years but one cigarette daily for 10 years, quit 2009.\par He reports mild chest discomfort and palpitations and was seen by cardiology in [place] and was started on Carvedilol. [de-identified] : autoCPAP

## 2021-06-02 NOTE — CONSULT LETTER
[Dear  ___] : Dear  [unfilled], [Consult Letter:] : I had the pleasure of evaluating your patient, [unfilled]. [Please see my note below.] : Please see my note below. [Consult Closing:] : Thank you very much for allowing me to participate in the care of this patient.  If you have any questions, please do not hesitate to contact me. [Sincerely,] : Sincerely, [FreeTextEntry3] : Brian Lopez MD, FCCP, D. ABSM\par Pulmonary and Sleep Medicine\par [facility] at [place]

## 2021-06-02 NOTE — REASON FOR VISIT
[Follow-Up] : a follow-up visit [Asthma] : asthma [Sleep Apnea] : sleep apnea [Shortness of Breath] : shortness of breath [[employer] ] : provided by [employer]   [Time Spent: ____ minutes] : I have spent [unfilled] minutes of time on the encounter. The patient's primary language is not English thus required  services. [TextBox_44] : weight issues [FreeTextEntry1] : [id number] [FreeTextEntry2] : Aparna

## 2021-06-02 NOTE — DISCUSSION/SUMMARY
[FreeTextEntry1] : \par #1. Will schedule PFTs in near future to assess lung function with Covid testing prior to PFTs\par #2. The patient does not appear to require chronic BD therapy at this time\par #3. Diet and exercise for weight loss\par #4. SOBOE is likely related to weight or deconditioning\par #5. 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 CXR to evaluate SOBOE and ? h/o nodules from 2/11/21 was clear\par #7. ENT evaluation for chronic sinusitis if desired by pt\par #8. Cardiology evaluation for h/o arrhythmias\par #9. Flonase nasal spray for post nasal drip as needed; on Claritin for allergies\par #10. F/u 2 months with PFTs and compliance \par #11. Replace equipment as needed; ordered 4/9/21\par #12. Recommended Covid vaccines \par #13. Reviewed risks of exposure and symptoms of Covid-19 virus, including how the virus is spread and precautions to avoid allen virus.\par \par Patient's questions were answered and patient appears to understand these recommendations

## 2021-08-01 ENCOUNTER — APPOINTMENT (OUTPATIENT)
Dept: DISASTER EMERGENCY | Facility: CLINIC | Age: 56
End: 2021-08-01

## 2021-08-02 LAB — SARS-COV-2 N GENE NPH QL NAA+PROBE: NOT DETECTED

## 2021-08-05 ENCOUNTER — APPOINTMENT (OUTPATIENT)
Dept: PULMONOLOGY | Facility: CLINIC | Age: 56
End: 2021-08-05
Payer: MEDICAID

## 2021-08-05 VITALS — BODY MASS INDEX: 29.03 KG/M2 | HEIGHT: 69 IN | WEIGHT: 196 LBS

## 2021-08-05 VITALS
HEART RATE: 65 BPM | OXYGEN SATURATION: 98 % | SYSTOLIC BLOOD PRESSURE: 140 MMHG | DIASTOLIC BLOOD PRESSURE: 80 MMHG | RESPIRATION RATE: 16 BRPM

## 2021-08-05 PROCEDURE — 94010 BREATHING CAPACITY TEST: CPT

## 2021-08-05 PROCEDURE — 85018 HEMOGLOBIN: CPT | Mod: QW

## 2021-08-05 PROCEDURE — 99072 ADDL SUPL MATRL&STAF TM PHE: CPT

## 2021-08-05 PROCEDURE — 94729 DIFFUSING CAPACITY: CPT

## 2021-08-05 PROCEDURE — T1013A: CUSTOM

## 2021-08-05 PROCEDURE — 99214 OFFICE O/P EST MOD 30 MIN: CPT | Mod: 25

## 2021-08-05 PROCEDURE — 94727 GAS DIL/WSHOT DETER LNG VOL: CPT

## 2021-08-05 NOTE — HISTORY OF PRESENT ILLNESS
[Former] : former [Never] : never [Good Compliance] : good compliance with treatment [Good Tolerance] : good tolerance of treatment [Good Symptom Control] : good symptom control [X-Ray] : an X-Ray [Follow-Up - Routine Clinic] : a routine clinic follow-up of [Intermittent] : intermittent [Dyspnea on Exertion] : dyspnea on exertion [None] : The patient is not currently being treated for this problem [TextBox_4] : Patient c/o SOBOE but is otherwise without associated respiratory complaints.\par He reports chronic sinusitis and rhinitis.\par He admits to being dx'd with asthma in [place].\par Smoked 6-8 cigarettes daily x 2 years but one cigarette daily for 10 years, quit 2009.\par He reports mild chest discomfort and palpitations and was seen by cardiology in [place] and was started on Carvedilol. [de-identified] : autoCPAP

## 2021-08-05 NOTE — DISCUSSION/SUMMARY
[FreeTextEntry1] : \par #1. PFTs performed today are essentially normal.\par #2. The patient does not appear to require chronic BD therapy at this time\par #3. SOBOE is likely related to weight or deconditioning given normal PFTs\par #4. Diet and exercise for weight loss \par #5. 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 CXR to evaluate SOBOE and ? h/o nodules from 2/11/21 was clear\par #7. ENT evaluation for chronic sinusitis if desired by pt\par #8. Cardiology evaluation for h/o arrhythmias\par #9. Flonase nasal spray for post nasal drip as needed; on Claritin for allergies\par #10. F/u 3 months with compliance \par #11. Replace equipment as needed; ordered 4/9/21\par #12. Recommended Covid vaccines \par #13. Reviewed risks of exposure and symptoms of Covid-19 virus, including how the virus is spread and precautions to avoid allen virus.\par \par Patient's questions were answered and patient appears to understand these recommendations

## 2021-08-05 NOTE — CONSULT LETTER
[Dear  ___] : Dear  [unfilled], [Consult Letter:] : I had the pleasure of evaluating your patient, [unfilled]. [Please see my note below.] : Please see my note below. [Consult Closing:] : Thank you very much for allowing me to participate in the care of this patient.  If you have any questions, please do not hesitate to contact me. [Sincerely,] : Sincerely, [FreeTextEntry3] : Brian Lopez MD, FCCP, D. ABSM\par Pulmonary and Sleep Medicine\par [facility] at [place]

## 2021-08-05 NOTE — REASON FOR VISIT
[Follow-Up] : a follow-up visit [Asthma] : asthma [Sleep Apnea] : sleep apnea [Shortness of Breath] : shortness of breath [[employer] ] : provided by [employer]   [Time Spent: ____ minutes] : Total time spent using  services: [unfilled] minutes. The patient's primary language is not English thus required  services. [TextBox_44] : weight issues [FreeTextEntry1] : [id number] [FreeTextEntry2] : Olinda [TWNoteComboBox1] : Guatemalan

## 2021-11-02 ENCOUNTER — APPOINTMENT (OUTPATIENT)
Dept: PULMONOLOGY | Facility: CLINIC | Age: 56
End: 2021-11-02
Payer: MEDICAID

## 2021-11-02 VITALS
OXYGEN SATURATION: 99 % | BODY MASS INDEX: 27.91 KG/M2 | WEIGHT: 189 LBS | HEART RATE: 60 BPM | SYSTOLIC BLOOD PRESSURE: 130 MMHG | DIASTOLIC BLOOD PRESSURE: 82 MMHG

## 2021-11-02 PROCEDURE — 99214 OFFICE O/P EST MOD 30 MIN: CPT

## 2021-11-02 PROCEDURE — 99072 ADDL SUPL MATRL&STAF TM PHE: CPT

## 2021-11-02 NOTE — REASON FOR VISIT
[Follow-Up] : a follow-up visit [Asthma] : asthma [Sleep Apnea] : sleep apnea [Shortness of Breath] : shortness of breath [[employer] ] : provided by [employer]   [Time Spent: ____ minutes] : Total time spent using  services: [unfilled] minutes. The patient's primary language is not English thus required  services. [TextBox_44] : weight issues [Interpreters_IDNumber] : [id number] [Interpreters_FullName] : Daniela [TWNoteComboBox1] : Jamaican

## 2021-11-02 NOTE — DISCUSSION/SUMMARY
[FreeTextEntry1] : \par #1. PFTs performed previously were essentially normal.\par #2. The patient does not appear to require chronic BD therapy at this time\par #3. SOBOE is likely related to weight or deconditioning given normal PFTs\par #4. Diet and exercise for weight loss \par #5. Continue autoCPAP to treat mild JEY with an AHI of 13.6; Reviewed with pt that there is a recall on the Kenyon CPAP device used for the treatment of JEY, they should contact their home care company. If alternative therapy is available such as an oral appliance or other CPAP, this alternate therapy should be used if obtaining a new device through their DME vendor is not an option currently. The risk of continuing to use a recalled Kenyon CPAP device should be weighed against the risk of untreated JEY or other breathing disorders which may include the increased short term risks of EDS, MVA, as well as increased risk of long term health consequences such as cardiovascular disease and glucose intolerance. \par #6. CXR to evaluate SOBOE and ? h/o nodules from 2/11/21 was clear\par #7. ENT evaluation for chronic sinusitis if desired by pt; info given to pt\par #8. Cardiology evaluation for h/o arrhythmias\par #9. Flonase nasal spray for post nasal drip as needed; on Claritin for allergies\par #10. F/u 6 months with compliance \par #11. Replace equipment as needed; ordered 4/9/21\par #12. Recommended Covid vaccines and flu vaccine; refusing both\par #13. Reviewed risks of exposure and symptoms of Covid-19 virus, including how the virus is spread and precautions to avoid allen virus.\par \par Patient's questions were answered and patient appears to understand these recommendations

## 2021-11-02 NOTE — CONSULT LETTER
[Dear  ___] : Dear  [unfilled], [Consult Letter:] : I had the pleasure of evaluating your patient, [unfilled]. [Please see my note below.] : Please see my note below. [Consult Closing:] : Thank you very much for allowing me to participate in the care of this patient.  If you have any questions, please do not hesitate to contact me. [Sincerely,] : Sincerely, [FreeTextEntry3] : Brian Lopez MD, FCCP, D. ABSM\par Pulmonary and Sleep Medicine\par [facility] at [place]

## 2021-11-02 NOTE — HISTORY OF PRESENT ILLNESS
[Former] : former [Never] : never [Good Compliance] : good compliance with treatment [Good Tolerance] : good tolerance of treatment [Good Symptom Control] : good symptom control [X-Ray] : an X-Ray [Follow-Up - Routine Clinic] : a routine clinic follow-up of [Intermittent] : intermittent [Dyspnea on Exertion] : dyspnea on exertion [None] : The patient is not currently being treated for this problem [TextBox_4] : Patient c/o SOBOE but is otherwise without associated respiratory complaints.\par He reports chronic sinusitis and rhinitis.\par He admits to being dx'd with asthma in [place].\par Smoked 6-8 cigarettes daily x 2 years but one cigarette daily for 10 years, quit 2009.\par He reports mild chest discomfort and palpitations and was seen by cardiology in [place] and was started on Carvedilol. [de-identified] : autoCPAP

## 2021-11-11 ENCOUNTER — APPOINTMENT (OUTPATIENT)
Dept: PULMONOLOGY | Facility: CLINIC | Age: 56
End: 2021-11-11

## 2022-03-19 PROBLEM — K64.2 PROLAPSED INTERNAL HEMORRHOIDS, GRADE 3: Status: ACTIVE | Noted: 2018-04-27

## 2022-05-03 ENCOUNTER — APPOINTMENT (OUTPATIENT)
Dept: PULMONOLOGY | Facility: CLINIC | Age: 57
End: 2022-05-03
Payer: MEDICAID

## 2022-05-03 VITALS
DIASTOLIC BLOOD PRESSURE: 70 MMHG | SYSTOLIC BLOOD PRESSURE: 120 MMHG | OXYGEN SATURATION: 98 % | BODY MASS INDEX: 29.18 KG/M2 | HEART RATE: 56 BPM | WEIGHT: 197 LBS | RESPIRATION RATE: 16 BRPM | HEIGHT: 69 IN

## 2022-05-03 PROCEDURE — 99214 OFFICE O/P EST MOD 30 MIN: CPT

## 2022-05-03 NOTE — CONSULT LETTER
[Dear  ___] : Dear  [unfilled], [Consult Letter:] : I had the pleasure of evaluating your patient, [unfilled]. [Please see my note below.] : Please see my note below. [Consult Closing:] : Thank you very much for allowing me to participate in the care of this patient.  If you have any questions, please do not hesitate to contact me. [Sincerely,] : Sincerely, [FreeTextEntry3] : Brian Lopez MD, FCCP, D. ABSM\par Pulmonary and Sleep Medicine\par [facility] at [place]

## 2022-05-03 NOTE — REASON FOR VISIT
[Follow-Up] : a follow-up visit [Asthma] : asthma [Sleep Apnea] : sleep apnea [Shortness of Breath] : shortness of breath [Ad Hoc ] : provided by an ad hoc  [TextBox_44] : weight issues, prior Covid 19 infection, PNDS [Interpreters_FullName] : Yue [Interpreters_Relationshiptopatient] : LPN [TWNoteComboBox1] : Tongan

## 2022-05-03 NOTE — DISCUSSION/SUMMARY
[FreeTextEntry1] : \par #1. PFTs performed previously were essentially normal.\par #2. The patient does not appear to require chronic BD therapy at this time\par #3. SOBOE is likely related to weight or deconditioning given normal PFTs\par #4. Diet and exercise for weight loss \par #5. Continue autoCPAP to treat mild JEY with an AHI of 13.6; pt now has new machine with good compliance\par #6. CXR to evaluate SOBOE and ? h/o nodules from 2/11/21 was clear\par #7. ENT evaluation for chronic sinusitis if desired by pt; info given to pt\par #8. Cardiology evaluation for h/o arrhythmias\par #9. Flonase nasal spray for post nasal drip as needed; on Claritin for allergies\par #10. F/u 4 months with compliance, CXR and PFTs\par #11. Replace equipment as needed; ordered 5/3/22; he has a new Respironics machine\par #12. Recommended Covid vaccines and flu vaccine; refusing both; recovered from prior Covid infection in 1/2022\par #13. Reviewed risks of exposure and symptoms of Covid-19 virus, including how the virus is spread and precautions to avoid allen virus.\par \par The patient expressed understanding and agreement with the above recommendations/plan and accepts responsibility to be compliant with recommended testing, therapies, and f/u visits.\par All relevant questions and concerns were addressed.

## 2022-05-03 NOTE — HISTORY OF PRESENT ILLNESS
[Former] : former [Never] : never [Good Compliance] : good compliance with treatment [Good Tolerance] : good tolerance of treatment [Good Symptom Control] : good symptom control [X-Ray] : an X-Ray [Follow-Up - Routine Clinic] : a routine clinic follow-up of [Intermittent] : intermittent [Dyspnea on Exertion] : dyspnea on exertion [None] : The patient is not currently being treated for this problem [TextBox_4] : Patient c/o SOBOE but is otherwise without associated respiratory complaints.\par He reports chronic sinusitis and rhinitis.\par He admits to being dx'd with asthma in [place].\par Smoked 6-8 cigarettes daily x 2 years but one cigarette daily for 10 years, quit 2009.\par He reports mild chest discomfort and palpitations and was seen by cardiology in [place] and was started on Carvedilol.\par Pt s/p Covid infection in 1/2022 when he developed chills, h/a, cough and loss of appetite. He did not require therapy other than cough medicine. He did use his Albuterol inhaler more frequently at that time. [de-identified] : autoCPAP

## 2022-09-05 LAB — SARS-COV-2 N GENE NPH QL NAA+PROBE: NOT DETECTED

## 2022-09-06 ENCOUNTER — APPOINTMENT (OUTPATIENT)
Dept: PULMONOLOGY | Facility: CLINIC | Age: 57
End: 2022-09-06

## 2022-09-06 VITALS
RESPIRATION RATE: 16 BRPM | DIASTOLIC BLOOD PRESSURE: 82 MMHG | SYSTOLIC BLOOD PRESSURE: 128 MMHG | OXYGEN SATURATION: 98 % | HEART RATE: 57 BPM

## 2022-09-06 VITALS — HEIGHT: 68 IN | BODY MASS INDEX: 31.37 KG/M2 | WEIGHT: 207 LBS

## 2022-09-06 PROCEDURE — 94729 DIFFUSING CAPACITY: CPT

## 2022-09-06 PROCEDURE — 99214 OFFICE O/P EST MOD 30 MIN: CPT | Mod: 25

## 2022-09-06 PROCEDURE — 94010 BREATHING CAPACITY TEST: CPT

## 2022-09-06 PROCEDURE — 85018 HEMOGLOBIN: CPT | Mod: QW

## 2022-09-06 PROCEDURE — 94727 GAS DIL/WSHOT DETER LNG VOL: CPT

## 2022-09-06 RX ORDER — BROMPHENIRAMINE MALEATE, PSEUDOEPHEDRINE HYDROCHLORIDE, 2; 30; 10 MG/5ML; MG/5ML; MG/5ML
30-2-10 SYRUP ORAL
Qty: 600 | Refills: 0 | Status: ACTIVE | COMMUNITY
Start: 2022-08-08

## 2022-09-06 RX ORDER — COVID-19 ANTIGEN TEST
KIT MISCELLANEOUS
Qty: 2 | Refills: 0 | Status: ACTIVE | COMMUNITY
Start: 2022-08-09

## 2022-09-06 RX ORDER — ATORVASTATIN CALCIUM 20 MG/1
20 TABLET, FILM COATED ORAL
Qty: 30 | Refills: 0 | Status: ACTIVE | COMMUNITY
Start: 2022-04-08

## 2022-09-06 RX ORDER — CETIRIZINE HYDROCHLORIDE 10 MG/1
10 TABLET, COATED ORAL
Qty: 30 | Refills: 0 | Status: ACTIVE | COMMUNITY
Start: 2022-08-19

## 2022-09-06 RX ORDER — TAMSULOSIN HYDROCHLORIDE 0.4 MG/1
0.4 CAPSULE ORAL
Qty: 30 | Refills: 0 | Status: ACTIVE | COMMUNITY
Start: 2022-08-08

## 2022-09-06 RX ORDER — ACETAMINOPHEN EXTRA STRENGTH 500 MG/1
500 TABLET ORAL
Qty: 56 | Refills: 0 | Status: ACTIVE | COMMUNITY
Start: 2022-08-08

## 2022-09-06 NOTE — REASON FOR VISIT
[Follow-Up] : a follow-up visit [Asthma] : asthma [Sleep Apnea] : sleep apnea [Shortness of Breath] : shortness of breath [Ad Hoc ] : provided by an ad hoc  [TextBox_44] : weight issues, prior Covid 19 infection, PNDS [Interpreters_FullName] : Yue [Interpreters_Relationshiptopatient] : LPN [TWNoteComboBox1] : Nigerian

## 2022-09-06 NOTE — CONSULT LETTER
[Dear  ___] : Dear  [unfilled], [Consult Letter:] : I had the pleasure of evaluating your patient, [unfilled]. [Please see my note below.] : Please see my note below. [Consult Closing:] : Thank you very much for allowing me to participate in the care of this patient.  If you have any questions, please do not hesitate to contact me. [Sincerely,] : Sincerely, [FreeTextEntry3] : Brian Lopez MD, FCCP, D. ABSM\par Pulmonary and Sleep Medicine\par [facility] at [place]

## 2022-09-06 NOTE — PHYSICAL EXAM
Head Injury   WHAT YOU NEED TO KNOW:   A head injury is most often caused by a blow to the head  This may occur from a fall, bicycle injury, sports injury, being struck in the head, or a motor vehicle accident  DISCHARGE INSTRUCTIONS:   Call 911 or have someone else call for any of the following:   · You cannot be woken  · You have a seizure  · You stop responding to others or you faint  · You have blurry or double vision  · Your speech becomes slurred or confused  · You have arm or leg weakness, loss of feeling, or new problems with coordination  · Your pupils are larger than usual or one pupil is a different size than the other  · You have blood or clear fluid coming out of your ears or nose  Return to the emergency department if:   · You have repeated or forceful vomiting  · You feel confused  · Your headache gets worse or becomes severe  · You or someone caring for you notices that you are harder to wake than usual   Contact your healthcare provider if:   · Your symptoms last longer than 6 weeks after the injury  · You have questions or concerns about your condition or care  Medicines:   · Acetaminophen  decreases pain  Acetaminophen is available without a doctor's order  Ask how much to take and how often to take it  Follow directions  Acetaminophen can cause liver damage if not taken correctly  · Take your medicine as directed  Contact your healthcare provider if you think your medicine is not helping or if you have side effects  Tell him or her if you are allergic to any medicine  Keep a list of the medicines, vitamins, and herbs you take  Include the amounts, and when and why you take them  Bring the list or the pill bottles to follow-up visits  Carry your medicine list with you in case of an emergency  Self-care:   · Rest  or do quiet activities for 24 to 48 hours   Limit your time watching TV, using the computer, or doing tasks that require a lot of thinking  Slowly return to your normal activities as directed  Do not play sports or do activities that may cause you to get hit in the head  Ask your healthcare provider when you can return to sports  · Apply ice  on your head for 15 to 20 minutes every hour or as directed  Use an ice pack, or put crushed ice in a plastic bag  Cover it with a towel before you apply it to your skin  Ice helps prevent tissue damage and decreases swelling and pain  · Have someone stay with you for 24 hours  or as directed  This person can monitor you for complications and call [phone]  When you are awake the person should ask you a few questions to see if you are thinking clearly  An example would be to ask your name or your address  Prevent another head injury:   · Wear a helmet that fits properly  Do this when you play sports, or ride a bike, scooter, or skateboard  Helmets help decrease your risk of a serious head injury  Talk to your healthcare provider about other ways you can protect yourself if you play sports  · Wear your seat belt every time you are in a car  This helps to decrease your risk for a head injury if you are in a car accident  Follow up with your healthcare provider as directed:  Write down your questions so you remember to ask them during your visits  © 2017 [address] Information is for End User's use only and may not be sold, redistributed or otherwise used for commercial purposes  All illustrations and images included in CareNotes® are the copyrighted property of A D A M , Inc  or Leobardo Doherty  The above information is an  only  It is not intended as medical advice for individual conditions or treatments  Talk to your doctor, nurse or pharmacist before following any medical regimen to see if it is safe and effective for you        Nail Avulsion   WHAT YOU NEED TO KNOW:   Nail avulsion is when part or all of a nail is torn away or removed from the nail bed  Avulsion may happen on your finger or toe  Common causes include ingrown nail, injury, or infection  The nail bed will form a hard layer and then a new nail may grow  The nail bed will be sensitive until the hard layer forms  You will need to keep it covered to prevent infection or more injury  You may need to care for your nail area for several months as the new nail grows  DISCHARGE INSTRUCTIONS:   Return to the emergency department if:   · Blood soaks through your bandage  · You have a red streak running up your leg or arm  Contact your healthcare provider if:   · You have a fever or chills  · Your injured area is red, swollen, or draining pus  · You have new or worsening pain, or pain that does not get better with medicine  · You have questions or concerns about your condition or care  Medicines:   · Antibiotics  may help treat or prevent a bacterial infection  · Acetaminophen  decreases pain and fever  It is available without a doctor's order  Ask how much to take and how often to take it  Follow directions  Acetaminophen can cause liver damage if not taken correctly  · NSAIDs , such as ibuprofen, help decrease swelling, pain, and fever  This medicine is available with or without a doctor's order  NSAIDs can cause stomach bleeding or kidney problems in certain people  If you take blood thinner medicine, always ask your healthcare provider if NSAIDs are safe for you  Always read the medicine label and follow directions  · Take your medicine as directed  Contact your healthcare provider if you think your medicine is not helping or if you have side effects  Tell him or her if you are allergic to any medicine  Keep a list of the medicines, vitamins, and herbs you take  Include the amounts, and when and why you take them  Bring the list or the pill bottles to follow-up visits  Carry your medicine list with you in case of an emergency    Self-care:   · Keep your nail area clean, dry, and covered  When you are allowed to bathe, carefully wash the area with soap and water  Put on a clean, new bandage  Do not use an adhesive bandage  It may stick to the wound and cause pain when you remove it  Ask your healthcare provider what kind of bandage to use  Change your bandage when it gets wet or dirty  Your healthcare provider may suggest that you change the bandage every 24 hours for the first few days  · Elevate  your hand or foot above the level of your heart as often as you can for 24 hours  This will help decrease swelling and pain  Prop your hand or foot on pillows or blankets to keep it elevated comfortably  · Apply ice  on your wound area for 15 to 20 minutes every hour or as directed  Use an ice pack, or put crushed ice in a plastic bag  Cover it with a towel  Ice helps prevent tissue damage and decreases swelling and pain  · Do not wear tight shoes  or shoes that do not fit well  Do not wear tights or pantyhose  Wear cotton socks  · Ask  when you can return to work, school, or your usual sports and activities  Follow up with your healthcare provider as directed: You may be referred to a hand or foot specialist  Write down your questions so you remember to ask them during your visits  © 2017 [address] Information is for End User's use only and may not be sold, redistributed or otherwise used for commercial purposes  All illustrations and images included in CareNotes® are the copyrighted property of A D A [employer]u Buru , [employer]  or Leobardo Doherty  The above information is an  only  It is not intended as medical advice for individual conditions or treatments  Talk to your doctor, nurse or pharmacist before following any medical regimen to see if it is safe and effective for you  [No Acute Distress] : no acute distress [Well Nourished] : well nourished [Well Developed] : well developed [Normal Appearance] : normal appearance [Supple] : supple [Normal Rate/Rhythm] : normal rate/rhythm [Normal S1, S2] : normal s1, s2 [No Murmurs] : no murmurs [No Resp Distress] : no resp distress [No Acc Muscle Use] : no acc muscle use [Normal Rhythm and Effort] : normal rhythm and effort [Clear to Auscultation Bilaterally] : clear to auscultation bilaterally [No Abnormalities] : no abnormalities [Benign] : benign [Soft] : soft [No Clubbing] : no clubbing [No Edema] : no edema [No Focal Deficits] : no focal deficits [Oriented x3] : oriented x3 [TextBox_2] : Pt is overweight

## 2022-09-06 NOTE — HISTORY OF PRESENT ILLNESS
[Former] : former [Never] : never [Good Compliance] : good compliance with treatment [Good Tolerance] : good tolerance of treatment [Good Symptom Control] : good symptom control [X-Ray] : an X-Ray [Follow-Up - Routine Clinic] : a routine clinic follow-up of [Intermittent] : intermittent [Dyspnea on Exertion] : dyspnea on exertion [None] : The patient is not currently being treated for this problem [TextBox_4] : Patient c/o SOBOE but is otherwise without associated respiratory complaints.\par He reports chronic sinusitis and rhinitis.\par He admits to being dx'd with asthma in [place].\par Smoked 6-8 cigarettes daily x 2 years but one cigarette daily for 10 years, quit 2009.\par He reports mild chest discomfort and palpitations and was seen by cardiology in [place] and was started on Carvedilol.\par Pt s/p Covid infection in 1/2022 when he developed chills, h/a, cough and loss of appetite. He did not require therapy other than cough medicine. He did use his Albuterol inhaler more frequently at that time. He had Covid infection again in 7/2022 but with mild URI symptoms and did not require therapy. [TextBox_11] : .1 [TextBox_13] : 10 [YearQuit] : 2009 [de-identified] : autoCPAP

## 2022-11-11 ENCOUNTER — EMERGENCY (EMERGENCY)
Facility: HOSPITAL | Age: 57
LOS: 1 days | Discharge: HOME | End: 2022-11-11
Attending: EMERGENCY MEDICINE
Payer: COMMERCIAL

## 2022-11-11 VITALS
DIASTOLIC BLOOD PRESSURE: 73 MMHG | RESPIRATION RATE: 20 BRPM | OXYGEN SATURATION: 95 % | SYSTOLIC BLOOD PRESSURE: 139 MMHG | WEIGHT: 201.94 LBS | HEART RATE: 95 BPM | TEMPERATURE: 98 F

## 2022-11-11 PROCEDURE — 73610 X-RAY EXAM OF ANKLE: CPT | Mod: 26,RT

## 2022-11-11 PROCEDURE — 73502 X-RAY EXAM HIP UNI 2-3 VIEWS: CPT | Mod: 26,LT

## 2022-11-11 PROCEDURE — 73562 X-RAY EXAM OF KNEE 3: CPT

## 2022-11-11 PROCEDURE — 73610 X-RAY EXAM OF ANKLE: CPT

## 2022-11-11 PROCEDURE — 73562 X-RAY EXAM OF KNEE 3: CPT | Mod: 26,RT

## 2022-11-11 PROCEDURE — 99284 EMERGENCY DEPT VISIT MOD MDM: CPT

## 2022-11-11 PROCEDURE — 73502 X-RAY EXAM HIP UNI 2-3 VIEWS: CPT

## 2022-11-11 PROCEDURE — 96372 THER/PROPH/DIAG INJ SC/IM: CPT

## 2022-11-11 RX ORDER — KETOROLAC TROMETHAMINE 30 MG/ML
30 SYRINGE (ML) INJECTION ONCE
Refills: 0 | Status: DISCONTINUED | OUTPATIENT
Start: 2022-11-11 | End: 2022-11-11

## 2022-11-11 RX ORDER — IBUPROFEN 200 MG
1 TABLET ORAL
Qty: 20 | Refills: 0
Start: 2022-11-11 | End: 2022-11-15

## 2022-11-11 RX ADMIN — Medication 30 MILLIGRAM(S): at 12:49

## 2022-11-11 NOTE — ED ADULT TRIAGE NOTE - ESI TRIAGE ACUITY LEVEL, MLM
11/10/2022    Patient: Corina Lutz   YOB: 1946   Date of Visit: 11/10/2022     Gunner Thomas NP  [address] [place] [address] [address] [id number]  Via Fax: [phone]    Dear Gunner Thomas NP,      Thank you for referring Mr. Gerhard Hooper to [address] [place] [address] for evaluation. My notes for this consultation are attached. If you have questions, please do not hesitate to call me. I look forward to following your patient along with you.       Sincerely,    Ligia Mckeon MD 4

## 2022-11-11 NOTE — ED PROVIDER NOTE - PHYSICAL EXAMINATION
Constitutional: Awake, alert and oriented. In no acute distress. Well appearing.  HEENT: NC/AT. Moist mucous membranes.  Eyes: No scleral icterus. EOMI.  Neck:. Soft and supple. Full ROM without pain. No step-offs deformities  Cardiac: Regular rate and regular rhythm. +S1/S2. Peripheral pulses 2+ and symmetric. No LE edema.  Respiratory: Speaking in full sentences. No evidence of respiratory distress. No wheezes, rales or rhonchi.  Abdomen: Soft, non-distended and non tender Normal bowel sounds in all 4 quadrants. No guarding or rebound. no CVAT  Back: Spine midline and non-tender. No step-offs deformities.   Skin: No rashes, abrasions or lacerations.  MSK: (+) mild tenderness over mid dorsal aspect right ankle. DP 2+ RLE and LLE. motor/sensory intact RLE and LLE.  Pt able to ambulate without any assistance. FROM bilateral lower extremities.   Lymph: No cervical lymphadenopathy.  Neuro: Awake, alert & oriented x 3. Moves all extremities symmetrically. Negative pronator drift, strength 5/5 all upper and lower extremities, sensation to light touch intact throughout upper/ lower extremities and face, finger to nose coordination intact, cranial nerves 2-12 intact  Psych: calm, cooperative, normal affect

## 2022-11-11 NOTE — ED PROVIDER NOTE - OBJECTIVE STATEMENT
56 y/o M with PMHx HTN, arrhythmias, JEY and PSHx cholecystectomy/hemorrhoidectomy presenting with c/o right ankle pain/knee pain and left hip pain s/p injury at work yesterday around 9 AM. Pt reports trying to catch a commercial  rack full of garments which was coming down the truck ramp but fell onto the garment rack and now with pain onto right ankle/knee and left hip pain. Denies any LOC and no trauma/injury to head/neck. Denies any numbness over extremities, n/v, fever/chills, sob, cp, abdominal pain, back pain, dizziness, weakness, neck pain and with no other c/o. Denies any use of anticoagulants.

## 2022-11-11 NOTE — ED PROVIDER NOTE - CLINICAL SUMMARY MEDICAL DECISION MAKING FREE TEXT BOX
56 y/o M with PMHx HTN, arrhythmias, JEY and PSHx cholecystectomy/hemorrhoidectomy presenting with c/o right ankle pain/knee pain and left hip pain s/p injury at work yesterday around 9 AM.  Physical exam grossly unremarkable. Most likely MSK etiology. Plan for pain meds, imaging and reassessment. 58 y/o M with PMHx HTN, arrhythmias, JEY and PSHx cholecystectomy/hemorrhoidectomy presenting with c/o right ankle pain/knee pain and left hip pain s/p injury at work yesterday around 9 AM.  Physical exam grossly unremarkable. Most likely MSK etiology. Given toradol, imaging wet read without any acute findings. RICE instructions given. Strict ED return precautions given if any new or worsening symptoms.

## 2022-11-11 NOTE — ED PROVIDER NOTE - NSFOLLOWUPINSTRUCTIONS_ED_ALL_ED_FT
Please take all medications as prescribed  Follow up with your PCP in 2-3 days  Return to the emergency room if you are experiencing any new or worsening symptoms    Sprain    A sprain is a stretch or tear in one of the tough, fiber-like tissues (ligaments) in your body. This is caused by an injury to the area such as a twisting mechanism. Symptoms include pain, swelling, or bruising. Rest that area over the next several days and slowly resume activity when tolerated. Ice can help with swelling and pain.     SEEK IMMEDIATE MEDICAL CARE IF YOU HAVE ANY OF THE FOLLOWING SYMPTOMS: worsening pain, inability to move that body part, numbness or tingling.

## 2022-11-11 NOTE — ED PROVIDER NOTE - PATIENT PORTAL LINK FT
You can access the FollowMyHealth Patient Portal offered by [facility] by registering at the following website: [email or web address]. By joining [facility]’s FollowMyHealth portal, you will also be able to view your health information using other applications (apps) compatible with our system.

## 2022-11-11 NOTE — ED PROVIDER NOTE - ATTENDING APP SHARED VISIT CONTRIBUTION OF CARE
Magdiel VILLELA- 56 Y/O M with h/o htn, arrhythmia, JEY, cholecystectomy, hemorrhoidectomy p/.w rt ankle pain when he fell on all 4 hands and knees while trying to stop a garment rack going down the slope , no hit to the head .Pt states that in the process of preventing fall, he twisted his rt ankle and had mild swelling but today its better and he is able to walk. ED  Ariadna    Pt is alert, well appearing male, s1s2 normal reg, b/l clear breath sounds, abd soft, nt, nd, neuro exam aox3, cn 2-12 intact, no focal deficits, skin warm, dry, good turgor, no focal tenderness or swelling of ankle rt side    plan to do xray ankle, xray neg, discharged with reassurance

## 2022-11-11 NOTE — ED PROVIDER NOTE - NS ED ATTENDING STATEMENT MOD
This was a shared visit with the YURY. I reviewed and verified the documentation and independently performed the documented:

## 2022-12-16 ENCOUNTER — OFFICE (OUTPATIENT)
Dept: URBAN - METROPOLITAN AREA CLINIC 115 | Facility: CLINIC | Age: 57
Setting detail: OPHTHALMOLOGY
End: 2022-12-16
Payer: COMMERCIAL

## 2022-12-16 DIAGNOSIS — H16.222: ICD-10-CM

## 2022-12-16 DIAGNOSIS — H53.001: ICD-10-CM

## 2022-12-16 DIAGNOSIS — H16.221: ICD-10-CM

## 2022-12-16 DIAGNOSIS — H01.005: ICD-10-CM

## 2022-12-16 DIAGNOSIS — H25.13: ICD-10-CM

## 2022-12-16 DIAGNOSIS — H01.002: ICD-10-CM

## 2022-12-16 PROBLEM — H16.223 DRY EYE SYNDROME K SICCA; RIGHT EYE, LEFT EYE, BOTH EYES: Status: ACTIVE | Noted: 2022-12-16

## 2022-12-16 PROCEDURE — 99213 OFFICE O/P EST LOW 20 MIN: CPT | Performed by: OPHTHALMOLOGY

## 2022-12-16 PROCEDURE — 68761 CLOSE TEAR DUCT OPENING: CPT | Performed by: OPHTHALMOLOGY

## 2022-12-16 PROCEDURE — 83861 MICROFLUID ANALY TEARS: CPT | Performed by: OPHTHALMOLOGY

## 2022-12-16 ASSESSMENT — REFRACTION_MANIFEST
OS_AXIS: 090
OD_VA1: 20/30
OS_CYLINDER: -0.50
OD_AXIS: 055
OS_SPHERE: PLANO
OS_VA1: 20/25
OS_AXIS: 080
OS_CYLINDER: -1.00
OD_ADD: +2.00
OD_VA1: 20/40+
OU_VA: 20/25
OD_SPHERE: PLANO
OD_SPHERE: PLANO
OD_CYLINDER: -0.25
OS_VA1: 20/20
OS_ADD: +2.00
OS_VA2: 20/20
OD_VA2: 20/20
OS_SPHERE: +0.25

## 2022-12-16 ASSESSMENT — REFRACTION_AUTOREFRACTION
OD_AXIS: 051
OS_AXIS: 089
OS_SPHERE: +0.25
OD_CYLINDER: -0.50
OS_CYLINDER: -0.75
OD_SPHERE: +0.25

## 2022-12-16 ASSESSMENT — TONOMETRY: OS_IOP_MMHG: 15

## 2022-12-16 ASSESSMENT — CONFRONTATIONAL VISUAL FIELD TEST (CVF)
OS_FINDINGS: FULL
OD_FINDINGS: FULL

## 2022-12-16 ASSESSMENT — REFRACTION_CURRENTRX
OD_AXIS: 036
OD_ADD: +2.25
OS_AXIS: 090
OD_OVR_VA: 20/
OS_VPRISM_DIRECTION: PROGS
OS_SPHERE: PLANO
OD_VPRISM_DIRECTION: PROGS
OD_SPHERE: PLANO
OS_CYLINDER: -0.75
OS_OVR_VA: 20/
OS_ADD: +2.25
OD_CYLINDER: -0.25

## 2022-12-16 ASSESSMENT — SPHEQUIV_DERIVED
OD_SPHEQUIV: 0
OS_SPHEQUIV: -0.25
OS_SPHEQUIV: -0.125

## 2022-12-16 ASSESSMENT — SUPERFICIAL PUNCTATE KERATITIS (SPK)
OS_SPK: 1+
OD_SPK: 2+

## 2022-12-16 ASSESSMENT — LID EXAM ASSESSMENTS
OD_BLEPHARITIS: RLL 1+
OS_BLEPHARITIS: LLL 1+

## 2022-12-16 ASSESSMENT — VISUAL ACUITY
OD_BCVA: 20/25
OS_BCVA: 20/40

## 2023-01-13 ENCOUNTER — APPOINTMENT (OUTPATIENT)
Dept: PULMONOLOGY | Facility: CLINIC | Age: 58
End: 2023-01-13
Payer: MEDICAID

## 2023-01-13 VITALS
SYSTOLIC BLOOD PRESSURE: 110 MMHG | OXYGEN SATURATION: 99 % | HEART RATE: 94 BPM | DIASTOLIC BLOOD PRESSURE: 66 MMHG | RESPIRATION RATE: 16 BRPM | BODY MASS INDEX: 32.43 KG/M2 | WEIGHT: 214 LBS | HEIGHT: 68 IN

## 2023-01-13 PROBLEM — I10 ESSENTIAL (PRIMARY) HYPERTENSION: Chronic | Status: ACTIVE | Noted: 2022-11-11

## 2023-01-13 PROBLEM — Z86.79 PERSONAL HISTORY OF OTHER DISEASES OF THE CIRCULATORY SYSTEM: Chronic | Status: ACTIVE | Noted: 2022-11-11

## 2023-01-13 PROCEDURE — 99214 OFFICE O/P EST MOD 30 MIN: CPT

## 2023-01-13 NOTE — HISTORY OF PRESENT ILLNESS
[Good Compliance] : good compliance with treatment [Good Tolerance] : good tolerance of treatment [Good Symptom Control] : good symptom control [X-Ray] : an X-Ray [Follow-Up - Routine Clinic] : a routine clinic follow-up of [Intermittent] : intermittent [Dyspnea on Exertion] : dyspnea on exertion [None] : The patient is not currently being treated for this problem [TextBox_4] : Patient c/o SOBOE but is otherwise without associated respiratory complaints.\par He reports chronic sinusitis and rhinitis.\par He admits to being dx'd with asthma in [place].\par Smoked 6-8 cigarettes daily x 2 years but one cigarette daily for 10 years, quit 2009.\par He reports mild chest discomfort and palpitations and was seen by cardiology in [place] and was started on Carvedilol.\par Pt s/p Covid infection in 1/2022 when he developed chills, h/a, cough and loss of appetite. He did not require therapy other than cough medicine. He did use his Albuterol inhaler more frequently at that time. He had Covid infection again in 7/2022 but with mild URI symptoms and did not require therapy. [de-identified] : autoCPAP

## 2023-01-13 NOTE — CONSULT LETTER
[Dear  ___] : Dear  [unfilled], [Consult Letter:] : I had the pleasure of evaluating your patient, [unfilled]. [Please see my note below.] : Please see my note below. [Consult Closing:] : Thank you very much for allowing me to participate in the care of this patient.  If you have any questions, please do not hesitate to contact me. [Sincerely,] : Sincerely, [FreeTextEntry3] : Brian Lopez MD, FCCP, D. ABSM\par Pulmonary and Sleep Medicine\par [facility] [place]

## 2023-01-13 NOTE — REASON FOR VISIT
[Follow-Up] : a follow-up visit [Asthma] : asthma [Sleep Apnea] : sleep apnea [Shortness of Breath] : shortness of breath [Ad Hoc ] : provided by an ad hoc  [TextBox_44] : weight issues, prior Covid 19 infection, PNDS [Interpreters_FullName] : Katlin [Interpreters_Relationshiptopatient] : MA [TWNoteComboBox1] : [place]

## 2023-01-13 NOTE — DISCUSSION/SUMMARY
[FreeTextEntry1] : \par #1. PFTs performed previously were essentially normal; repeat were also normal with only slight reduction; monitor annually\par #2. The patient does not appear to require chronic BD therapy at this time\par #3. SOBOE is likely related to weight or deconditioning given normal PFTs\par #4. Diet and exercise for weight loss \par #5. Continue autoCPAP to treat mild JEY with an AHI of 13.6; pt now has a new Respironics machine with good compliance\par #6. CXR to evaluate SOBOE and ? h/o nodules from 2/11/21 was clear; repeat from 8/30/22 and 1/10223 revealed ? b/l nodules vs nipple shadows so will repeat\par #7. ENT evaluation for chronic sinusitis if desired by pt; info given to pt again\par #8. Cardiology evaluation for h/o arrhythmias\par #9. Flonase nasal spray for post nasal drip as needed; on Claritin for allergies\par #10. F/u 3 months with compliance and CXR\par #11. Replace equipment as needed; ordered 5/3/22; he has a new Respironics machine\par #12. Recommended Covid vaccines and flu vaccine; refusing both; recovered from prior Covid infection in 1/2022 and 7/2022\par #13. Reviewed risks of exposure and symptoms of Covid-19 virus, including how the virus is spread and precautions to avoid allen virus.\par \par The patient expressed understanding and agreement with the above recommendations/plan and accepts responsibility to be compliant with recommended testing, therapies, and f/u visits.\par All relevant questions and concerns were addressed.

## 2023-04-10 ENCOUNTER — OUTPATIENT (OUTPATIENT)
Dept: OUTPATIENT SERVICES | Facility: HOSPITAL | Age: 58
LOS: 1 days | End: 2023-04-10
Payer: MEDICAID

## 2023-04-10 DIAGNOSIS — R06.02 SHORTNESS OF BREATH: ICD-10-CM

## 2023-04-10 PROCEDURE — 71046 X-RAY EXAM CHEST 2 VIEWS: CPT | Mod: 26

## 2023-04-14 ENCOUNTER — OFFICE (OUTPATIENT)
Dept: URBAN - METROPOLITAN AREA CLINIC 115 | Facility: CLINIC | Age: 58
Setting detail: OPHTHALMOLOGY
End: 2023-04-14
Payer: COMMERCIAL

## 2023-04-14 DIAGNOSIS — H16.223: ICD-10-CM

## 2023-04-14 DIAGNOSIS — H01.005: ICD-10-CM

## 2023-04-14 DIAGNOSIS — H16.222: ICD-10-CM

## 2023-04-14 DIAGNOSIS — H01.002: ICD-10-CM

## 2023-04-14 DIAGNOSIS — H25.13: ICD-10-CM

## 2023-04-14 DIAGNOSIS — H35.033: ICD-10-CM

## 2023-04-14 PROBLEM — H10.45 ALLERGIC CONJUNCTIVITIS: Status: ACTIVE | Noted: 2023-04-14

## 2023-04-14 PROBLEM — H16.221 DRY EYE SYNDROME K SICCA; RIGHT EYE, LEFT EYE, BOTH EYES: Status: ACTIVE | Noted: 2023-04-14

## 2023-04-14 PROCEDURE — 92012 INTRM OPH EXAM EST PATIENT: CPT | Performed by: OPHTHALMOLOGY

## 2023-04-14 PROCEDURE — 92250 FUNDUS PHOTOGRAPHY W/I&R: CPT | Performed by: OPHTHALMOLOGY

## 2023-04-14 PROCEDURE — 83861 MICROFLUID ANALY TEARS: CPT | Performed by: OPHTHALMOLOGY

## 2023-04-14 ASSESSMENT — REFRACTION_CURRENTRX
OD_AXIS: 036
OD_OVR_VA: 20/
OS_VPRISM_DIRECTION: PROGS
OD_SPHERE: -0.25
OD_VPRISM_DIRECTION: PROGS
OS_VPRISM_DIRECTION: PROGS
OS_ADD: +2.25
OD_CYLINDER: 0.00
OD_AXIS: 180
OS_CYLINDER: -0.75
OS_OVR_VA: 20/
OD_ADD: +2.25
OS_SPHERE: PLANO
OS_OVR_VA: 20/
OD_VPRISM_DIRECTION: PROGS
OS_AXIS: 090
OS_CYLINDER: -0.75
OS_ADD: +2.00
OD_ADD: +2.25
OD_SPHERE: PLANO
OD_OVR_VA: 20/
OD_CYLINDER: -0.25
OS_AXIS: 091
OS_SPHERE: +0.25

## 2023-04-14 ASSESSMENT — SUPERFICIAL PUNCTATE KERATITIS (SPK)
OD_SPK: 1+
OS_SPK: 1+

## 2023-04-14 ASSESSMENT — VISUAL ACUITY
OD_BCVA: 20/30
OS_BCVA: 20/40-1

## 2023-04-14 ASSESSMENT — REFRACTION_AUTOREFRACTION
OS_CYLINDER: -0.75
OD_SPHERE: +0.25
OS_AXIS: 089
OS_SPHERE: +0.25
OD_CYLINDER: -0.50
OD_AXIS: 051

## 2023-04-14 ASSESSMENT — REFRACTION_MANIFEST
OS_AXIS: 080
OD_SPHERE: PLANO
OU_VA: 20/25
OD_ADD: +2.00
OS_SPHERE: PLANO
OS_CYLINDER: -1.00
OD_SPHERE: PLANO
OS_ADD: +2.00
OS_CYLINDER: -0.50
OS_VA1: 20/20
OD_VA1: 20/30
OS_SPHERE: +0.25
OD_CYLINDER: -0.25
OS_VA1: 20/25
OD_VA2: 20/20
OS_VA2: 20/20
OD_AXIS: 055
OS_AXIS: 090
OD_VA1: 20/40+

## 2023-04-14 ASSESSMENT — SPHEQUIV_DERIVED
OS_SPHEQUIV: -0.125
OS_SPHEQUIV: -0.25
OD_SPHEQUIV: 0

## 2023-04-14 ASSESSMENT — TONOMETRY
OS_IOP_MMHG: 10
OD_IOP_MMHG: 10

## 2023-04-14 ASSESSMENT — CONFRONTATIONAL VISUAL FIELD TEST (CVF)
OS_FINDINGS: FULL
OD_FINDINGS: FULL

## 2023-04-14 ASSESSMENT — LID EXAM ASSESSMENTS
OD_BLEPHARITIS: RLL 1+
OS_BLEPHARITIS: LLL 1+

## 2023-04-17 ENCOUNTER — APPOINTMENT (OUTPATIENT)
Dept: PULMONOLOGY | Facility: CLINIC | Age: 58
End: 2023-04-17
Payer: MEDICAID

## 2023-04-17 VITALS
WEIGHT: 213 LBS | BODY MASS INDEX: 32.28 KG/M2 | DIASTOLIC BLOOD PRESSURE: 66 MMHG | RESPIRATION RATE: 16 BRPM | OXYGEN SATURATION: 98 % | HEIGHT: 68 IN | SYSTOLIC BLOOD PRESSURE: 108 MMHG | HEART RATE: 72 BPM

## 2023-04-17 PROCEDURE — 99214 OFFICE O/P EST MOD 30 MIN: CPT

## 2023-04-17 RX ORDER — HYDROXYZINE HYDROCHLORIDE 25 MG/1
25 TABLET ORAL
Qty: 30 | Refills: 0 | Status: DISCONTINUED | COMMUNITY
Start: 2022-06-03 | End: 2023-04-17

## 2023-04-17 RX ORDER — MELOXICAM 7.5 MG/1
7.5 TABLET ORAL
Qty: 30 | Refills: 0 | Status: DISCONTINUED | COMMUNITY
Start: 2022-06-30 | End: 2023-04-17

## 2023-04-17 RX ORDER — UBIQUINOL 100 MG
CAPSULE ORAL
Refills: 0 | Status: DISCONTINUED | COMMUNITY
End: 2023-04-17

## 2023-04-17 NOTE — CONSULT LETTER
[Dear  ___] : Dear  [unfilled], [Consult Letter:] : I had the pleasure of evaluating your patient, [unfilled]. [Please see my note below.] : Please see my note below. [Consult Closing:] : Thank you very much for allowing me to participate in the care of this patient.  If you have any questions, please do not hesitate to contact me. [Sincerely,] : Sincerely, [FreeTextEntry3] : Brian Lopez MD, FCCP, D. ABSM\par Pulmonary and Sleep Medicine\par [facility] [place]

## 2023-04-17 NOTE — HISTORY OF PRESENT ILLNESS
[Good Compliance] : good compliance with treatment [Good Tolerance] : good tolerance of treatment [Good Symptom Control] : good symptom control [X-Ray] : an X-Ray [Intermittent] : intermittent [Dyspnea on Exertion] : dyspnea on exertion [None] : The patient is not currently being treated for this problem [Follow-Up - Routine Clinic] : a routine clinic follow-up of [Excess Weight] : excess weight [Currently Experiencing] : The patient is currently experiencing symptoms. [Dyspnea] : dyspnea [Back Pain] : back pain [Low Calorie Diet] : low calorie diet [Fair Compliance] : fair compliance with treatment [Fair Tolerance] : fair tolerance of treatment [Poor Symptom Control] : poor symptom control [Sleep Apnea] : sleep apnea [High] : high [Low Calorie] : low calorie [Well Balanced Diet] : well balanced meals [TextBox_4] : Patient c/o SOBOE but is otherwise without associated respiratory complaints.\par He reports chronic sinusitis and rhinitis.\par He admits to being dx'd with asthma in [place].\par Smoked 6-8 cigarettes daily x 2 years but one cigarette daily for 10 years, quit 2009.\par He reports mild chest discomfort and palpitations and was seen by cardiology in [place] and was started on Carvedilol.\par Pt s/p Covid infection in 1/2022 when he developed chills, h/a, cough and loss of appetite. He did not require therapy other than cough medicine. He did use his Albuterol inhaler more frequently at that time. He had Covid infection again in 7/2022 but with mild URI symptoms and did not require therapy. [de-identified] : autoCPAP

## 2023-04-17 NOTE — DISCUSSION/SUMMARY
[FreeTextEntry1] : \par #1. PFTs performed previously were essentially normal; repeat were also normal with only slight reduction; monitor annually\par #2. The patient does not appear to require chronic BD therapy at this time; uses Albuterol as needed\par #3. SOBOE is likely related to weight or deconditioning given normal PFTs\par #4. Diet and exercise for weight loss \par #5. Continue autoCPAP to treat mild JEY with an AHI of 13.6; pt now has a new Respironics machine with good compliance\par #6. Chest CT to evaluate ? nodule seen on CXR\par #7. ENT evaluation for chronic sinusitis if desired by pt; info given to pt multiple time\par #8. Cardiology evaluation for h/o arrhythmias\par #9. Flonase nasal spray for post nasal drip as needed; on Claritin for allergies\par #10. F/u 1-2 months with compliance and chest CT\par #11. Replace equipment as needed; ordered 5/3/22; he has a new Respironics machine\par #12. Recommended Covid vaccines and flu vaccine; refusing both; recovered from prior Covid infection in 1/2022 and 7/2022\par #13. Reviewed risks of exposure and symptoms of Covid-19 virus, including how the virus is spread and precautions to avoid allen virus.\par \par The patient expressed understanding and agreement with the above recommendations/plan and accepts responsibility to be compliant with recommended testing, therapies, and f/u visits.\par All relevant questions and concerns were addressed.

## 2023-04-17 NOTE — REASON FOR VISIT
[Follow-Up] : a follow-up visit [Asthma] : asthma [Sleep Apnea] : sleep apnea [Shortness of Breath] : shortness of breath [Ad Hoc ] : provided by an ad hoc  [TextBox_44] : weight issues, prior Covid 19 infection, PNDS [Interpreters_FullName] : Rolo [Interpreters_Relationshiptopatient] : MA [TWNoteComboBox1] : [place]

## 2023-04-17 NOTE — REVIEW OF SYSTEMS
[SOB on Exertion] : sob on exertion [Seasonal Allergies] : seasonal allergies [Negative] : Endocrine

## 2023-04-25 ENCOUNTER — OUTPATIENT (OUTPATIENT)
Dept: OUTPATIENT SERVICES | Facility: HOSPITAL | Age: 58
LOS: 1 days | End: 2023-04-25
Payer: COMMERCIAL

## 2023-04-25 ENCOUNTER — APPOINTMENT (OUTPATIENT)
Dept: CT IMAGING | Facility: CLINIC | Age: 58
End: 2023-04-25
Payer: MEDICAID

## 2023-04-25 DIAGNOSIS — R93.89 ABNORMAL FINDINGS ON DIAGNOSTIC IMAGING OF OTHER SPECIFIED BODY STRUCTURES: ICD-10-CM

## 2023-04-25 PROCEDURE — 71250 CT THORAX DX C-: CPT

## 2023-04-25 PROCEDURE — 71250 CT THORAX DX C-: CPT | Mod: 26

## 2023-05-16 RX ORDER — CARVEDILOL 25 MG/1
25 TABLET ORAL 2 TIMES DAILY WITH MEALS
COMMUNITY
Start: 2016-03-19

## 2023-05-16 RX ORDER — OXYCODONE HYDROCHLORIDE AND ACETAMINOPHEN 5; 325 MG/1; MG/1
1 TABLET ORAL EVERY 4 HOURS PRN
COMMUNITY
Start: 2018-04-27

## 2023-05-16 RX ORDER — ASPIRIN 81 MG/1
81 TABLET, CHEWABLE ORAL DAILY
COMMUNITY
Start: 2016-03-19

## 2023-05-16 RX ORDER — CALCIUM CARBONATE/VITAMIN D3 500-10/5ML
1 LIQUID (ML) ORAL PRN
COMMUNITY

## 2023-05-16 RX ORDER — IBUPROFEN 200 MG
200 TABLET ORAL
COMMUNITY

## 2023-05-16 RX ORDER — LOSARTAN POTASSIUM 100 MG/1
100 TABLET ORAL DAILY
COMMUNITY
Start: 2016-03-19

## 2023-05-30 ENCOUNTER — APPOINTMENT (OUTPATIENT)
Dept: PULMONOLOGY | Facility: CLINIC | Age: 58
End: 2023-05-30
Payer: MEDICAID

## 2023-05-30 VITALS
OXYGEN SATURATION: 99 % | HEIGHT: 68 IN | DIASTOLIC BLOOD PRESSURE: 64 MMHG | HEART RATE: 67 BPM | WEIGHT: 212 LBS | SYSTOLIC BLOOD PRESSURE: 110 MMHG | RESPIRATION RATE: 16 BRPM | BODY MASS INDEX: 32.13 KG/M2

## 2023-05-30 PROCEDURE — 99214 OFFICE O/P EST MOD 30 MIN: CPT

## 2023-05-30 NOTE — DISCUSSION/SUMMARY
[FreeTextEntry1] : \par #1. PFTs performed previously were essentially normal; repeat were also normal with only slight reduction; monitor annually\par #2. The patient does not appear to require chronic BD therapy at this time; uses Albuterol as needed\par #3. SOBOE is likely related to weight or deconditioning given normal PFTs\par #4. Diet and exercise for weight loss \par #5. Continue autoCPAP to treat mild JEY with an AHI of 13.6; pt now has a new Respironics machine with good compliance\par #6. Chest CT revealed multiple PONCHO nodules measuring up to 8 mm in size; will obtain PET CT\par #7. ENT evaluation for chronic sinusitis if desired by pt; info given to pt multiple time\par #8. Cardiology evaluation for h/o arrhythmias\par #9. Flonase nasal spray for post nasal drip as needed; on Claritin for allergies\par #10. F/u in 3 weeks with PET CT\par #11. Replace equipment as needed; ordered 5/30/23; he has a new Respironics machine\par #12. Recommended Covid vaccines and flu vaccine; refusing both; recovered from prior Covid infection in 1/2022 and 7/2022\par #13. Reviewed risks of exposure and symptoms of Covid-19 virus, including how the virus is spread and precautions to avoid allen virus.\par \par The patient expressed understanding and agreement with the above recommendations/plan and accepts responsibility to be compliant with recommended testing, therapies, and f/u visits.\par All relevant questions and concerns were addressed.

## 2023-05-30 NOTE — HISTORY OF PRESENT ILLNESS
[Good Compliance] : good compliance with treatment [Good Tolerance] : good tolerance of treatment [Good Symptom Control] : good symptom control [Excess Weight] : excess weight [Currently Experiencing] : The patient is currently experiencing symptoms. [Dyspnea] : dyspnea [Back Pain] : back pain [Low Calorie Diet] : low calorie diet [Fair Compliance] : fair compliance with treatment [Fair Tolerance] : fair tolerance of treatment [Poor Symptom Control] : poor symptom control [Sleep Apnea] : sleep apnea [High] : high [Low Calorie] : low calorie [Well Balanced Diet] : well balanced meals [X-Ray] : an X-Ray [Follow-Up - Routine Clinic] : a routine clinic follow-up of [Intermittent] : intermittent [Dyspnea on Exertion] : dyspnea on exertion [None] : The patient is not currently being treated for this problem [TextBox_4] : Smoked 6-8 cigarettes daily x 2 years but one cigarette daily for 10 years, quit 2009.\par Patient c/o SOBOE but is otherwise without associated respiratory complaints.\par He reports chronic sinusitis and rhinitis.\par He admits to being dx'd with asthma in [place].\par He reports mild chest discomfort and palpitations and was seen by cardiology in [place] and was started on Carvedilol.\par Pt s/p Covid infection in 1/2022 when he developed chills, h/a, cough and loss of appetite. He did not require therapy other than cough medicine. He did use his Albuterol inhaler more frequently at that time. He had Covid infection again in 7/2022 but with mild URI symptoms and did not require therapy. [de-identified] : autoCPAP

## 2023-05-30 NOTE — REASON FOR VISIT
[Follow-Up] : a follow-up visit [Asthma] : asthma [Sleep Apnea] : sleep apnea [Shortness of Breath] : shortness of breath [Ad Hoc ] : provided by an ad hoc  [TextBox_44] : weight issues, prior Covid 19 infection, PNDS [Interpreters_FullName] : Katlin [Interpreters_Relationshiptopatient] : MA [TWNoteComboBox1] : [place]

## 2023-06-15 ENCOUNTER — OUTPATIENT (OUTPATIENT)
Dept: OUTPATIENT SERVICES | Facility: HOSPITAL | Age: 58
LOS: 1 days | End: 2023-06-15

## 2023-06-15 ENCOUNTER — APPOINTMENT (OUTPATIENT)
Dept: NUCLEAR MEDICINE | Facility: CLINIC | Age: 58
End: 2023-06-15
Payer: MEDICAID

## 2023-06-15 DIAGNOSIS — R91.1 SOLITARY PULMONARY NODULE: ICD-10-CM

## 2023-06-15 PROCEDURE — 78815 PET IMAGE W/CT SKULL-THIGH: CPT | Mod: 26,PI

## 2023-06-28 ENCOUNTER — APPOINTMENT (OUTPATIENT)
Dept: PULMONOLOGY | Facility: CLINIC | Age: 58
End: 2023-06-28
Payer: MEDICAID

## 2023-06-28 VITALS
BODY MASS INDEX: 31.83 KG/M2 | DIASTOLIC BLOOD PRESSURE: 72 MMHG | OXYGEN SATURATION: 98 % | RESPIRATION RATE: 16 BRPM | HEART RATE: 64 BPM | WEIGHT: 210 LBS | SYSTOLIC BLOOD PRESSURE: 126 MMHG | HEIGHT: 68 IN

## 2023-06-28 PROCEDURE — 99214 OFFICE O/P EST MOD 30 MIN: CPT

## 2023-06-28 RX ORDER — FLUTICASONE FUROATE 27.5 UG/1
27.5 SPRAY, METERED NASAL DAILY
Qty: 1 | Refills: 3 | Status: ACTIVE | COMMUNITY
Start: 1900-01-01 | End: 1900-01-01

## 2023-06-28 NOTE — CONSULT LETTER
[Dear  ___] : Dear  [unfilled], [Consult Letter:] : I had the pleasure of evaluating your patient, [unfilled]. [Please see my note below.] : Please see my note below. [Consult Closing:] : Thank you very much for allowing me to participate in the care of this patient.  If you have any questions, please do not hesitate to contact me. [Sincerely,] : Sincerely, [FreeTextEntry3] : Brian Lopez MD, FCCP, D. ABSM\par Pulmonary and Sleep Medicine\par [facility] [place]

## 2023-06-28 NOTE — DISCUSSION/SUMMARY
[FreeTextEntry1] : \par #1. PFTs performed previously were essentially normal; repeat were also normal with only slight reduction; monitor annually\par #2. The patient does not appear to require chronic BD therapy at this time; uses Albuterol as needed\par #3. SOBOE is likely related to weight or deconditioning given normal PFTs\par #4. Diet and exercise for weight loss \par #5. Continue autoCPAP to treat mild JEY with an AHI of 13.6; pt now has a new Respironics machine with good compliance though mild residual JEY with an AHI of 5.7, possibly related to weight gain though has APAP; consider adjustment of pressures if needed\par #6. Chest CT revealed multiple PONCHO nodules measuring up to 8 mm in size; PET CT was without significant uptake so would repeat CT in 12/2023 for 6 month f/u to continue to monitor nodules\par #7. ENT evaluation for chronic sinusitis if desired by pt; info given to pt multiple times\par #8. Cardiology evaluation for h/o arrhythmias\par #9. Flonase nasal spray for post nasal drip as needed; on Claritin for allergies\par #10. F/u in 3 months with compliance and anton\par #11. Replace equipment as needed; ordered 5/30/23; he has a new Respironics machine\par #12. Recommended Covid vaccines and flu vaccine; refusing both; recovered from prior Covid infection in 1/2022 and 7/2022\par \par The patient expressed understanding and agreement with the above recommendations/plan and accepts responsibility to be compliant with recommended testing, therapies, and f/u visits.\par All relevant questions and concerns were addressed.

## 2023-06-28 NOTE — HISTORY OF PRESENT ILLNESS
[Good Compliance] : good compliance with treatment [Good Tolerance] : good tolerance of treatment [Good Symptom Control] : good symptom control [Excess Weight] : excess weight [Currently Experiencing] : The patient is currently experiencing symptoms. [Dyspnea] : dyspnea [Back Pain] : back pain [Low Calorie Diet] : low calorie diet [Fair Compliance] : fair compliance with treatment [Fair Tolerance] : fair tolerance of treatment [Poor Symptom Control] : poor symptom control [Sleep Apnea] : sleep apnea [High] : high [Low Calorie] : low calorie [Well Balanced Diet] : well balanced meals [X-Ray] : an X-Ray [Follow-Up - Routine Clinic] : a routine clinic follow-up of [Intermittent] : intermittent [Dyspnea on Exertion] : dyspnea on exertion [None] : The patient is not currently being treated for this problem [TextBox_4] : Smoked 6-8 cigarettes daily x 2 years but one cigarette daily for 10 years, quit 2009.\par Patient c/o SOBOE but is otherwise without associated respiratory complaints.\par He reports chronic sinusitis and rhinitis.\par He admits to being dx'd with asthma in [place].\par He reports mild chest discomfort and palpitations and was seen by cardiology in [place] and was started on Carvedilol.\par Pt s/p Covid infection in 1/2022 when he developed chills, h/a, cough and loss of appetite. He did not require therapy other than cough medicine. He did use his Albuterol inhaler more frequently at that time. He had Covid infection again in 7/2022 but with mild URI symptoms and did not require therapy. [de-identified] : autoCPAP

## 2023-06-28 NOTE — REASON FOR VISIT
[Follow-Up] : a follow-up visit [Asthma] : asthma [Sleep Apnea] : sleep apnea [Shortness of Breath] : shortness of breath [Ad Hoc ] : provided by an ad hoc  [TextBox_44] : weight issues, prior Covid 19 infection, PNDS [Interpreters_FullName] : Katlin [Interpreters_Relationshiptopatient] : MA [TWNoteComboBox1] : [place]ank[place]

## 2023-08-10 NOTE — CONSULT LETTER
REFERRING PHYSICIAN: Brenton Gan MD    DATE:  08/10/2023    PREOPERATIVE DIAGNOSIS:  Chronic otitis media.    POSTOPERATIVE DIAGNOSIS:  Chronic otitis media.    PROCEDURE:  Bilateral tympanostomy tube placement.    ASSISTANT:  None.    ESTIMATED BLOOD LOSS:  0.    ANESTHESIA:  General mask.    COMPLICATIONS:  None.    INDICATIONS:  This is a 13-month-old female with a history of chronic otitis media that has been refractory to medical therapy.  She presents for PE tube placement.  A preoperative discussion was held with the patient's family.  We discussed the risks and benefits of surgery as well as the postoperative care that will be involved and alternatives to surgery.  Our discussion included but was not limited to bleeding, infection, complications from anesthesia, persistent tympanic membrane perforation.  All questions were answered.    DESCRIPTION OF PROCEDURE:  The patient was taken to the operating room.  She was prepped and draped in the usual fashion in a supine position.  The right ear was addressed 1st.  Cerumen was removed using a cerumen loop.  An incision was made in the anterior-inferior quadrant of the tympanic membrane.  The middle ear space was dry.  A PE tube was placed.  Next, the left ear was addressed.  Cerumen was removed using a cerumen loop.  An incision was made in the anterior-inferior quadrant of the tympanic membrane.  The middle     ear space was dry.  A PE tube was placed.  The patient tolerated the procedure well.  There were no complications.        Dictated By:  Brenton Gan MD        D:  08/10/2023 06:40:56  T:  08/10/2023 07:00:53  CW/modl  Job:  [id number]      cc:  Brenton Gan MD   [Dear  ___] : Dear  [unfilled], [Consult Letter:] : I had the pleasure of evaluating your patient, [unfilled]. [Please see my note below.] : Please see my note below. [Consult Closing:] : Thank you very much for allowing me to participate in the care of this patient.  If you have any questions, please do not hesitate to contact me. [Sincerely,] : Sincerely, [FreeTextEntry3] : Brian Lopez MD, FCCP, D. ABSM\par Pulmonary and Sleep Medicine\par [facility] at [place]

## 2023-10-03 ENCOUNTER — APPOINTMENT (OUTPATIENT)
Dept: PULMONOLOGY | Facility: CLINIC | Age: 58
End: 2023-10-03
Payer: MEDICAID

## 2023-10-03 VITALS — HEIGHT: 68 IN | WEIGHT: 202 LBS | BODY MASS INDEX: 30.62 KG/M2

## 2023-10-03 VITALS
DIASTOLIC BLOOD PRESSURE: 70 MMHG | RESPIRATION RATE: 16 BRPM | SYSTOLIC BLOOD PRESSURE: 120 MMHG | HEART RATE: 60 BPM | OXYGEN SATURATION: 98 %

## 2023-10-03 PROCEDURE — 99214 OFFICE O/P EST MOD 30 MIN: CPT | Mod: 25

## 2023-10-03 PROCEDURE — 94010 BREATHING CAPACITY TEST: CPT

## 2023-12-15 ENCOUNTER — EMERGENCY (EMERGENCY)
Facility: HOSPITAL | Age: 58
LOS: 1 days | Discharge: HOME | End: 2023-12-15
Attending: STUDENT IN AN ORGANIZED HEALTH CARE EDUCATION/TRAINING PROGRAM
Payer: COMMERCIAL

## 2023-12-15 VITALS
WEIGHT: 199.96 LBS | HEART RATE: 65 BPM | OXYGEN SATURATION: 97 % | TEMPERATURE: 98 F | RESPIRATION RATE: 18 BRPM | DIASTOLIC BLOOD PRESSURE: 74 MMHG | HEIGHT: 67 IN | SYSTOLIC BLOOD PRESSURE: 123 MMHG

## 2023-12-15 LAB
B PERT DNA SPEC QL NAA+PROBE: SIGNIFICANT CHANGE UP
B PERT DNA SPEC QL NAA+PROBE: SIGNIFICANT CHANGE UP
C PNEUM DNA SPEC QL NAA+PROBE: SIGNIFICANT CHANGE UP
C PNEUM DNA SPEC QL NAA+PROBE: SIGNIFICANT CHANGE UP
FLUAV H1 2009 PAND RNA SPEC QL NAA+PROBE: DETECTED
FLUAV H1 2009 PAND RNA SPEC QL NAA+PROBE: DETECTED
FLUAV H1 RNA SPEC QL NAA+PROBE: SIGNIFICANT CHANGE UP
FLUAV H1 RNA SPEC QL NAA+PROBE: SIGNIFICANT CHANGE UP
FLUAV H3 RNA SPEC QL NAA+PROBE: SIGNIFICANT CHANGE UP
FLUAV H3 RNA SPEC QL NAA+PROBE: SIGNIFICANT CHANGE UP
FLUAV SUBTYP SPEC NAA+PROBE: DETECTED
FLUAV SUBTYP SPEC NAA+PROBE: DETECTED
FLUBV RNA SPEC QL NAA+PROBE: SIGNIFICANT CHANGE UP
FLUBV RNA SPEC QL NAA+PROBE: SIGNIFICANT CHANGE UP
HADV DNA SPEC QL NAA+PROBE: SIGNIFICANT CHANGE UP
HADV DNA SPEC QL NAA+PROBE: SIGNIFICANT CHANGE UP
HCOV PNL SPEC NAA+PROBE: SIGNIFICANT CHANGE UP
HCOV PNL SPEC NAA+PROBE: SIGNIFICANT CHANGE UP
HMPV RNA SPEC QL NAA+PROBE: SIGNIFICANT CHANGE UP
HMPV RNA SPEC QL NAA+PROBE: SIGNIFICANT CHANGE UP
HPIV1 RNA SPEC QL NAA+PROBE: SIGNIFICANT CHANGE UP
HPIV1 RNA SPEC QL NAA+PROBE: SIGNIFICANT CHANGE UP
HPIV2 RNA SPEC QL NAA+PROBE: SIGNIFICANT CHANGE UP
HPIV2 RNA SPEC QL NAA+PROBE: SIGNIFICANT CHANGE UP
HPIV3 RNA SPEC QL NAA+PROBE: SIGNIFICANT CHANGE UP
HPIV3 RNA SPEC QL NAA+PROBE: SIGNIFICANT CHANGE UP
HPIV4 RNA SPEC QL NAA+PROBE: SIGNIFICANT CHANGE UP
HPIV4 RNA SPEC QL NAA+PROBE: SIGNIFICANT CHANGE UP
RAPID RVP RESULT: DETECTED
RAPID RVP RESULT: DETECTED
RV+EV RNA SPEC QL NAA+PROBE: SIGNIFICANT CHANGE UP
RV+EV RNA SPEC QL NAA+PROBE: SIGNIFICANT CHANGE UP
SARS-COV-2 RNA SPEC QL NAA+PROBE: SIGNIFICANT CHANGE UP
SARS-COV-2 RNA SPEC QL NAA+PROBE: SIGNIFICANT CHANGE UP

## 2023-12-15 PROCEDURE — 93005 ELECTROCARDIOGRAM TRACING: CPT

## 2023-12-15 PROCEDURE — 0225U NFCT DS DNA&RNA 21 SARSCOV2: CPT

## 2023-12-15 PROCEDURE — 71046 X-RAY EXAM CHEST 2 VIEWS: CPT

## 2023-12-15 PROCEDURE — 99285 EMERGENCY DEPT VISIT HI MDM: CPT | Mod: 25

## 2023-12-15 PROCEDURE — T1013: CPT

## 2023-12-15 PROCEDURE — 99284 EMERGENCY DEPT VISIT MOD MDM: CPT

## 2023-12-15 PROCEDURE — 71046 X-RAY EXAM CHEST 2 VIEWS: CPT | Mod: 26

## 2023-12-15 PROCEDURE — 93010 ELECTROCARDIOGRAM REPORT: CPT

## 2023-12-15 RX ADMIN — Medication 200 MILLIGRAM(S): at 13:32

## 2023-12-15 NOTE — ED PROVIDER NOTE - PHYSICAL EXAMINATION
General-alert and oriented to person place and time, nontoxic appearing, pleasant cooperative, NAD  HEENT-normocephalic, atraumatic, NT to palp, EOMI, PERRLA, no conjunctival injections, nares patent, pinna nt to palp, tympanic membrane intact bilaterally, nonbulging TM, no erythema noted, +light reflex, moist oral mucosa, tongue nonenlarged, uvula midline, tonsils nonenlarged, no exudates or erythema noted  Neck- supple, trach midline, No JVD, no LAD  Chest- Nt to palp, no reproducible pain  Cardio-s1,s2 present, regular rate and rhythm  Resp- talks in full sentences, symmetrical chest rise, CTA bilat, no evidence of wheezes, rhonchi noted  MSK- moves all extremities, able to ambulate without issues  Back- nt to palp of cervical, thoracic, lumbar spine, nt to palp of paraspinal m., No CVA tenderness  Neuro- no focal deficits, sensation intact

## 2023-12-15 NOTE — ED PROVIDER NOTE - OBJECTIVE STATEMENT
58-year-old male with history of hypertension and SVT currently on carvedilol and losartan presents to ED complaining of 3 days of cough congestion generalized bodyaches.  patient notes that he has been taking Mucinex over-the-counter with minimal relief.  Patient notes he had episode of palpitations yesterday but has since subsided no chest pain no shortness of breath no lightheadedness no dizziness.

## 2023-12-15 NOTE — ED ADULT NURSE NOTE - NSFALLUNIVINTERV_ED_ALL_ED
Bed/Stretcher in lowest position, wheels locked, appropriate side rails in place/Call bell, personal items and telephone in reach/Instruct patient to call for assistance before getting out of bed/chair/stretcher/Non-slip footwear applied when patient is off stretcher/[place] to call system/Physically safe environment - no spills, clutter or unnecessary equipment/Purposeful proactive rounding/Room/bathroom lighting operational, light cord in reach Bed/Stretcher in lowest position, wheels locked, appropriate side rails in place/Call bell, personal items and telephone in reach/Instruct patient to call for assistance before getting out of bed/chair/stretcher/Non-slip footwear applied when patient is off stretcher/[place] to call system/Physically safe environment - no spills, clutter or unnecessary equipment/Purposeful proactive rounding/Room/bathroom lighting operational, light cord in reach

## 2023-12-15 NOTE — ED PROVIDER NOTE - CLINICAL SUMMARY MEDICAL DECISION MAKING FREE TEXT BOX
58-year-old male with history of hypertension and SVT currently on carvedilol and losartan presents to ED complaining of 3 days of cough congestion generalized bodyaches. EKG showing sinus tessa at 58 BPM,, chest xray with no acute consolidation, Pt reassessed, pt feeling better at this time, vss, pt able to walk, talk and vocalized plan of action. Discussed in depth and explained to pt in depth the next steps that need to be taking including proper follow up with PCP or specialists. All incidental findings were discussed with pt as well. Pt verbalized their concerns and all questions were answered. Pt understands dispo and wants discharge. Given good instructions when to return to ED and importance of f/u.

## 2023-12-15 NOTE — ED ADULT NURSE NOTE - OBJECTIVE STATEMENT
CC cold/flu like s/sx for 3-4 days. Endorse body ache, congestion, f/c/c but denies n/v/d. Has been using mucinex with no relief. AO4, NAD. Assessment completed with , Vlad, clara.

## 2023-12-15 NOTE — ED ADULT TRIAGE NOTE - CHIEF COMPLAINT QUOTE
c/o cold like symptoms x 3 -4 days and coughing a lot c/o congestion neg for covid at home not sure if he had a fever, says he has been taking mucinex and cough medicine without relief

## 2023-12-15 NOTE — ED PROVIDER NOTE - PATIENT PORTAL LINK FT
You can access the FollowMyHealth Patient Portal offered by [facility] by registering at the following website: [email or web address]. By joining [employer]’s FollowMyHealth portal, you will also be able to view your health information using other applications (apps) compatible with our system. You can access the FollowMyHealth Patient Portal offered by [facility] by registering at the following website: [email or web address]. By joining [employer]’s FollowMyHealth portal, you will also be able to view your health information using other applications (apps) compatible with our system.

## 2023-12-15 NOTE — ED ADULT NURSE NOTE - NSSEPSISNEWALTERMENTAL_ED_A_ED
DOCUMENTATION ONLY:   Prior authorization for Exjade 500mg approved from 02/28/2019 to 08/27/2019  Case ID: [id number]    Co-pay: $77.41    Patient Assistance IS required. Sending to the financial assistance team to investigate assistance options. Ban LAZO     No

## 2023-12-15 NOTE — ED PROVIDER NOTE - ATTENDING APP SHARED VISIT CONTRIBUTION OF CARE
none
I, Purnima Elizabeth MD, have reviewed the ACP's documentation. After personally examining the patient, getting an independent history, and formulating an MDM, my findings have been added to this documentation as indicated.      Vlad

## 2023-12-18 ENCOUNTER — APPOINTMENT (OUTPATIENT)
Dept: CT IMAGING | Facility: CLINIC | Age: 58
End: 2023-12-18
Payer: MEDICAID

## 2023-12-18 ENCOUNTER — OUTPATIENT (OUTPATIENT)
Dept: OUTPATIENT SERVICES | Facility: HOSPITAL | Age: 58
LOS: 1 days | End: 2023-12-18

## 2023-12-18 DIAGNOSIS — R91.8 OTHER NONSPECIFIC ABNORMAL FINDING OF LUNG FIELD: ICD-10-CM

## 2023-12-18 PROCEDURE — 71250 CT THORAX DX C-: CPT | Mod: 26

## 2024-01-03 ENCOUNTER — APPOINTMENT (OUTPATIENT)
Dept: PULMONOLOGY | Facility: CLINIC | Age: 59
End: 2024-01-03
Payer: MEDICAID

## 2024-01-03 VITALS
HEART RATE: 56 BPM | HEIGHT: 68 IN | BODY MASS INDEX: 30.16 KG/M2 | WEIGHT: 199 LBS | DIASTOLIC BLOOD PRESSURE: 72 MMHG | OXYGEN SATURATION: 98 % | SYSTOLIC BLOOD PRESSURE: 120 MMHG | RESPIRATION RATE: 16 BRPM

## 2024-01-03 DIAGNOSIS — R91.1 SOLITARY PULMONARY NODULE: ICD-10-CM

## 2024-01-03 DIAGNOSIS — R91.8 OTHER NONSPECIFIC ABNORMAL FINDING OF LUNG FIELD: ICD-10-CM

## 2024-01-03 DIAGNOSIS — E66.3 OVERWEIGHT: ICD-10-CM

## 2024-01-03 DIAGNOSIS — G47.33 OBSTRUCTIVE SLEEP APNEA (ADULT) (PEDIATRIC): ICD-10-CM

## 2024-01-03 DIAGNOSIS — R06.02 SHORTNESS OF BREATH: ICD-10-CM

## 2024-01-03 DIAGNOSIS — E66.9 OBESITY, UNSPECIFIED: ICD-10-CM

## 2024-01-03 DIAGNOSIS — R09.82 POSTNASAL DRIP: ICD-10-CM

## 2024-01-03 DIAGNOSIS — Z87.891 PERSONAL HISTORY OF NICOTINE DEPENDENCE: ICD-10-CM

## 2024-01-03 DIAGNOSIS — R93.89 ABNORMAL FINDINGS ON DIAGNOSTIC IMAGING OF OTHER SPECIFIED BODY STRUCTURES: ICD-10-CM

## 2024-01-03 DIAGNOSIS — Z86.16 PERSONAL HISTORY OF COVID-19: ICD-10-CM

## 2024-01-03 DIAGNOSIS — J45.20 MILD INTERMITTENT ASTHMA, UNCOMPLICATED: ICD-10-CM

## 2024-01-03 PROCEDURE — 99214 OFFICE O/P EST MOD 30 MIN: CPT

## 2024-01-03 RX ORDER — ALBUTEROL SULFATE 90 UG/1
108 (90 BASE) INHALANT RESPIRATORY (INHALATION)
Qty: 3 | Refills: 1 | Status: ACTIVE | COMMUNITY
Start: 2021-02-05 | End: 1900-01-01

## 2024-01-03 RX ORDER — METHYLPREDNISOLONE 4 MG/1
4 TABLET ORAL
Qty: 1 | Refills: 0 | Status: ACTIVE | COMMUNITY
Start: 2024-01-03 | End: 1900-01-01

## 2024-01-03 RX ORDER — FLUTICASONE PROPIONATE 50 UG/1
50 SPRAY, METERED NASAL DAILY
Qty: 1 | Refills: 5 | Status: ACTIVE | COMMUNITY
Start: 2022-08-19 | End: 1900-01-01

## 2024-01-03 NOTE — REASON FOR VISIT
[Follow-Up] : a follow-up visit [Asthma] : asthma [Sleep Apnea] : sleep apnea [Shortness of Breath] : shortness of breath [Ad Hoc ] : provided by an ad hoc  [TextBox_44] : weight issues, prior Covid 19 infection, PNDS [Interpreters_FullName] : Rolo [Interpreters_Relationshiptopatient] : MA [TWNoteComboBox1] : [place]

## 2024-01-03 NOTE — DISCUSSION/SUMMARY
[FreeTextEntry1] : #1. PFTs performed previously were essentially normal; repeat were also normal with only slight reduction; current spirometry was normal; monitor annually. #2. The patient does not appear to require chronic BD therapy at this time; uses Albuterol as needed. #3. SOBOE is likely related to weight or deconditioning given normal PFTs. #4. Diet and exercise for weight loss. #5. 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 #6. Chest CT revealed multiple PONCHO nodules measuring up to 8 mm in size; PET CT was without significant uptake so would repeat CT in 12/2023 for 6-month f/u to continue to monitor nodules. #7. ENT evaluation for chronic sinusitis if desired by pt; info given to pt multiple times. #8. Cardiology evaluation for h/o arrhythmias. #9. Flonase nasal spray for postnasal drip as needed; on Claritin for allergies. #10. Medrol dose hood for cough as needed. #11. Replace equipment as needed; ordered 5/30/23; he has a new Respironics machine. #12. Recommended Covid vaccines and flu vaccine; refusing both; recovered from prior Covid infection in 1/2022 and 7/2022.  The patient expressed understanding and agreement with the above recommendations/plan and accepts responsibility to be compliant with recommended testing, therapies, and f/u visits. All relevant questions and concerns were addressed.

## 2024-01-03 NOTE — RESULTS/DATA
[TextEntry] : CXR from 2/11/21 was clear and other imaging as above. Home PSG from 3/14/21 revealed mild JEY with an AHI of 13.6. CXR from 12/15/23 was clear. The patient's overall compliance is % with a >4hr compliance of % on autoCPAP with an average and near max pressure of 6-7.5 and 8-11 cm of water, respectively with a residual AHI of 3.6-5.7 which is near normal; currently at 3.9.

## 2024-01-03 NOTE — CONSULT LETTER
[Dear  ___] : Dear  [unfilled], [Consult Letter:] : I had the pleasure of evaluating your patient, [unfilled]. [Please see my note below.] : Please see my note below. [Consult Closing:] : Thank you very much for allowing me to participate in the care of this patient.  If you have any questions, please do not hesitate to contact me. [Sincerely,] : Sincerely, [FreeTextEntry3] : Brian Lpoez MD, FCCP, D. ABSM\par  Pulmonary and Sleep Medicine\par  [facility] at [place]

## 2024-01-03 NOTE — END OF VISIT
[Time Spent: ___ minutes] : I have spent [unfilled] minutes of time on the encounter. [TextEntry] : Discussed with pt at length regarding JEY, asthma, arrhythmia, weight issues/overweight, chronic sinusitis, PNDS, and ? b/l nodules through ; reviewed w/u with pt as above.

## 2024-01-03 NOTE — HISTORY OF PRESENT ILLNESS
Occupational Therapy  OCCUPATIONAL THERAPY INITIAL EVALUATION     [place] [address] [facility]  [address] [facility], [address] [facility]                                                Patient Name: Alissa Barrios  MRN: [id number]  : 1956  Room: [facility]    Referring Provider: Aayush Caba MD  Specific Provider Orders/Date: OT eval and treat 22    Diagnosis: NSTEMI (non-ST elevated myocardial infarction) [facility]) [I21.4]   Pt admitted to hospital on 22 for back and abdominal pain    Surgery / Procedure: On   1. Coronary angiography. 2.  Balloon angioplasty of in-stent stenosis of the mid RCA    Pertinent Medical History:  has a past medical history of Acute infection of bone ([facility] [address] Acute osteomyelitis of phalanx of left hand ([facility] [address] Anemia of chronic disease, Arthritis, Breast cancer ([facility] [address] CAD (coronary artery disease), Carpal tunnel syndrome, Chronic diastolic CHF (congestive heart failure) ([facility] [address] CKD (chronic kidney disease) stage 4, GFR 15-29 ml/min ([facility] [address] Diabetic retinopathy ([facility] [address] Glaucoma, Hemodialysis patient ([facility] [address] Hyperkalemia, diminished renal excretion, Hyperlipidemia, Hypertension, 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        Precautions:  Fall Risk, HD MWF, bed alarm, spine neutrality (back><R LE pain)    Assessment of current deficits    [x] Functional mobility  [x]ADLs  [x] Strength               []Cognition    [x] Functional transfers   [x] IADLs         [x] Safety Awareness   [x]Endurance    [] Fine Coordination              [x] Balance      [] Vision/perception   []Sensation     []Gross Motor Coordination  [] ROM  [] Delirium [] Motor Control     OT PLAN OF CARE   OT POC based on physician orders, patient diagnosis and results of clinical assessment    Frequency/Duration 1-3 days/wk for 2 weeks PRN   Specific OT Treatment Interventions to include:   * Instruction/training on adapted ADL techniques and AE recommendations to increase functional independence within precautions       * Training on energy conservation strategies, correct breathing pattern and techniques to improve independence/tolerance for self-care routine  * Functional transfer/mobility training/DME recommendations for increased independence, safety, and fall prevention  * Patient/Family education to increase follow through with safety techniques and functional independence  * Recommendation of environmental modifications for increased safety with functional transfers/mobility and ADLs  * Cognitive retraining/development of therapeutic activities to improve problem solving, judgement, memory, and attention for increased safety/participation in ADL/IADL tasks  * Therapeutic exercise to improve motor endurance, ROM, and functional strength for ADLs/functional transfers  * Therapeutic activities to facilitate/challenge dynamic balance, stand tolerance for increased safety and independence with ADLs  * Therapeutic activities to facilitate gross/fine motor skills for increased independence with ADLs      Recommended Adaptive Equipment: TBD     Home Living: Pt admitted from [facility]  Pt resides alone in 1 floor home. 0 NIKKO    Prior Level of Function (at [facility]): Independent/mod I w/ grooming and feeding tasks, assistance with additional ADLs; ambulated short distances w/ staff assist    Pain Level: Pt c/o no pain at rest, severe back>R LE pain w/ EOB activity this session ; reinforced management strategies including repositioning.     Cognition: A&O: 3/4; Follows 1 step directions   Memory:  fair    Sequencing:  fair    Problem solving:  fair -   Judgement/safety:  fair -     Functional Assessment:  AM-PAC Daily Activity Raw Score: 12/24   Initial Eval Status  Date: 5/9/22 Treatment Status  Date: STGs = LTGs  Time frame: 10-14 days   Feeding Stand by Assist   Modified [place]    Grooming Minimal Assist   Washed hands and face  Modified [place]    UB Dressing Moderate Assist   Donned/doffed gown  Modified [place]    LB Dressing Dependent   Moderate Assist    Bathing Maximal Assist  Minimal Assist    Toileting Dependent   Including bowel management  On bedpan at room entry (rolled L/R)  Moderate Assist    Bed Mobility  Rolling: Min A  Supine to sit: Moderate Assist   Sit to supine: Moderate Assist   Supine to sit: Stand by Assist   Sit to supine: Stand by Assist    Functional Transfers NT  Minimal Assist    Functional Mobility NT  -  Will continue to assess   Balance Sitting:     Static:  Min A    Dynamic:Min A  Standing: NT     Activity Tolerance Poor+  Limited by pain w/ bed mobility  Fair   Visual/  Perceptual Glasses: no                  Hand Dominance R   AROM (PROM) Strength Additional Info:    RUE  WFL 3+/5 good  and wfl FMC/dexterity noted during ADL tasks       LUE WFL Deferred d/t bleeding from fistula good  and wfl FMC/dexterity noted during ADL tasks       Hearing: [facility]   Sensation:  No c/o numbness or tingling  Tone: WFL   Edema: B UE's    Comments: Upon arrival patient lying in bed. Therapist educated pt on role of OT. At end of session, patient lying in bed (bed alarm on) with call light and phone within reach, all lines and tubes intact. Overall patient demonstrated decreased independence and safety during completion of ADL/functional transfer/mobility tasks. Pt would benefit from continued skilled OT to increase safety and independence with completion of ADL/IADL tasks for functional independence and quality of life.     Treatment: OT treatment provided this date includes: Facilitation of bed mobility (education/cues for body mechanics, spine neutrality for pain management. Rest breaks required d/t noted fatigue and pain) and unsupported sitting balance (addressing posture, weight shifting and safety to prep for ADL's. Limited sit tolerance d/t pain). Therapist facilitated self-care retraining: UB self-care tasks (gown), toileting task and grooming tasks while educating/cuing pt on modified techniques, posture, safety and energy conservation techniques. Skilled monitoring of HR, O2 sats and pts response to treatment. At room entry, noted bleeding from L UE fistula. Nursing staff notified. Temporary gauze/tape placed on L UE for activity until RN arrived w/ supplies. Rehab Potential: Good for established goals     Patient / Family Goal: not stated     Patient and/or family were instructed on functional diagnosis, prognosis/goals and OT plan of care. Demonstrated fair understanding. Eval Complexity: Low    Time In:14:15   Time Out: 14:37  Total Treatment Time: 11 minutes    Min Units   OT Eval Low 97165  X  1   OT Eval Medium 94314      OT Eval High 88211      OT Re-Eval [id number]       Therapeutic Ex 04800       Therapeutic Activities 73448  3 0    ADL/Self Care 71430  8  1   Orthotic Management 22995       Manual [id number]     Neuro Re-Ed [id number]       Non-Billable Time          Evaluation Time additionally includes thorough review of current medical information, gathering information on past medical history/social history and prior level of function, interpretation of standardized testing/informal observation of tasks, assessment of data and development of plan of care and goals.         GAYLE SeymourR/L #[id number] [Good Compliance] : good compliance with treatment [Good Tolerance] : good tolerance of treatment [Good Symptom Control] : good symptom control [Excess Weight] : excess weight [Currently Experiencing] : The patient is currently experiencing symptoms. [Dyspnea] : dyspnea [Back Pain] : back pain [Low Calorie Diet] : low calorie diet [Fair Compliance] : fair compliance with treatment [Fair Tolerance] : fair tolerance of treatment [Poor Symptom Control] : poor symptom control [Sleep Apnea] : sleep apnea [High] : high [Low Calorie] : low calorie [Well Balanced Diet] : well balanced meals [Follow-Up - Routine Clinic] : a routine clinic follow-up of [Intermittent] : intermittent [Dyspnea on Exertion] : dyspnea on exertion [None] : The patient is not currently being treated for this problem [X-Ray] : an X-Ray [TextBox_4] : Smoked 6-8 cigarettes daily x 2 years but one cigarette daily for 10 years, quit 2009. Patient c/o SOBOE but is otherwise without associated respiratory complaints. He reports chronic sinusitis and rhinitis. He admits to being dx'd with asthma in [place]. He reports mild chest discomfort and palpitations and was seen by cardiology in [place] and was started on Carvedilol. Pt s/p Covid infection in 1/2022 when he developed chills, h/a, cough and loss of appetite. He did not require therapy other than cough medicine. He did use his Albuterol inhaler more frequently at that time. He had Covid infection again in 7/2022 but with mild URI symptoms and did not require therapy. In [facility] ED on 12/15/23 with URI symptoms but now better with mild residual cough. [de-identified] : autoCPAP [TextEntry] : CXR from 2/11/21 was clear. CXR from 8/30/22 revealed ? b/l shadows possibly related to nipples. CXR from 1/10/23 revealed similar findings but was not done with nipple markers as was ordered. CXR from 4/10/23 revealed a ? 1.1 cm nodule vs calcification of the fifth costochondral junction. Chest CT from 4/25/23 revealed PONCHO nodules measuring up to 8 mm in size. PET CT from 6/15/23 revealed no significant uptake in the previously seen nodules. Chest CT from 12/18/23 revealed no significant change c/w previous studies - reviewed results with patient.

## 2024-04-08 DIAGNOSIS — M79.646 PAIN IN UNSPECIFIED HAND: ICD-10-CM

## 2024-04-08 DIAGNOSIS — M79.643 PAIN IN UNSPECIFIED HAND: ICD-10-CM

## 2024-04-08 NOTE — ED ADULT NURSE NOTE - CHPI ED NUR CONTEXT2
PAST MEDICAL HISTORY:  B12 deficiency     Colon cancer 6 yrs ago. did not require chemo    Gallstone     GERD (Gastroesophageal Reflux Disease)     Hard of Hearing     Hypertension     Lymphedema, limb BLE     coughing

## 2024-04-09 ENCOUNTER — APPOINTMENT (OUTPATIENT)
Dept: ORTHOPEDIC SURGERY | Facility: CLINIC | Age: 59
End: 2024-04-09
Payer: COMMERCIAL

## 2024-04-09 VITALS
HEIGHT: 68 IN | SYSTOLIC BLOOD PRESSURE: 138 MMHG | HEART RATE: 60 BPM | WEIGHT: 200 LBS | BODY MASS INDEX: 30.31 KG/M2 | DIASTOLIC BLOOD PRESSURE: 84 MMHG | TEMPERATURE: 98.1 F

## 2024-04-09 DIAGNOSIS — M65.30 TRIGGER FINGER, UNSPECIFIED FINGER: ICD-10-CM

## 2024-04-09 DIAGNOSIS — M25.531 PAIN IN RIGHT WRIST: ICD-10-CM

## 2024-04-09 DIAGNOSIS — M79.642 PAIN IN RIGHT HAND: ICD-10-CM

## 2024-04-09 DIAGNOSIS — G56.00 CARPAL TUNNEL SYNDROME, UNSPECIFIED UPPER LIMB: ICD-10-CM

## 2024-04-09 DIAGNOSIS — M79.641 PAIN IN RIGHT HAND: ICD-10-CM

## 2024-04-09 DIAGNOSIS — M25.532 PAIN IN RIGHT WRIST: ICD-10-CM

## 2024-04-09 PROCEDURE — 73130 X-RAY EXAM OF HAND: CPT | Mod: 50

## 2024-04-09 PROCEDURE — 99204 OFFICE O/P NEW MOD 45 MIN: CPT | Mod: 25

## 2024-04-09 PROCEDURE — 20526 THER INJECTION CARP TUNNEL: CPT | Mod: 50,59

## 2024-04-09 PROCEDURE — 20550 NJX 1 TENDON SHEATH/LIGAMENT: CPT | Mod: 59,RT

## 2024-04-09 NOTE — ASSESSMENT
[FreeTextEntry1] : ASSESSMENT: The patient comes in today with multiple chronic exacerbated complaints including peripheral nerve impingement concerning for carpal tunnel as well as tendinopathy in multiple fingers.  At this point in time we have discussed treatment modalities and he elects for injections.  He should do quite well with these. For these conditions typically I do not have restrictions.  He currently is disabled as recommended by his primary care doctor.  He can follow-up with his primary care doctor for further recommendations regarding these conditions.   The patient was adequately and thoroughly informed of my assessment of their current condition(s).  - This may diminish bodily function for the extremity. We discussed prognosis, tx modalities including operative and nonoperative options for the above diagnostic assessment. As always, 2nd opinion is always provided as an option.  When accessible, I was able to review other physicians note(s) including reviewing other tests, imaging results as well as personally view these results for my own interpretation.   Injection:   The risks and benefits of a steroid injection were discussed in detail. The risks include but are not limited to: pain, infection, swelling, flare response, bleeding, subcutaneous fat atrophy, skin depigmentation and/or elevation of blood sugar. The risk of incomplete resolution of symptoms, recurrence and additional intervention was reviewed and considered by the patient. The patient agreed to proceed and under a sterile prep, I injected 1 unit 6mg into 1 cc of a combination of Celestone and Lidocaine into the bilateral carpal tunnel, right middle A1, right ring A1, left thumb A1. The patient tolerated the injection well. The patient was adequately and thoroughly informed of my assessment of their current condition(s).  DISCUSSION: 1.  Injections as above.  Activity modification.  No restrictions 2. [x] 3. [x]

## 2024-04-09 NOTE — HISTORY OF PRESENT ILLNESS
[FreeTextEntry1] : Anmol is a 59-year-old male who presents today with chronic worsening complaints of finger stiffness and triggering and numbness and tingling as well.  It is inhibiting ADLs.

## 2024-04-09 NOTE — PHYSICAL EXAM
[de-identified] : Examination of the [bilateral] wrist reveals discomfort with compression at the level of the volar carpal tunnel eliciting numbness/tingling throughout the fingertips Examination of the hand(s)  particularly at the A1 of the right middle, right ring, left thumb reveals tenderness with a palpable click. [de-identified] : [4] views of [bilateral hands and wrists] were obtained today in my office and were seen by me and discussed with the patient.  These negative

## 2024-09-22 ENCOUNTER — EMERGENCY (EMERGENCY)
Facility: HOSPITAL | Age: 59
LOS: 1 days | Discharge: HOME | End: 2024-09-22
Attending: EMERGENCY MEDICINE
Payer: COMMERCIAL

## 2024-09-22 VITALS
TEMPERATURE: 98 F | RESPIRATION RATE: 18 BRPM | HEART RATE: 54 BPM | DIASTOLIC BLOOD PRESSURE: 80 MMHG | OXYGEN SATURATION: 97 % | SYSTOLIC BLOOD PRESSURE: 160 MMHG

## 2024-09-22 VITALS
SYSTOLIC BLOOD PRESSURE: 162 MMHG | RESPIRATION RATE: 18 BRPM | OXYGEN SATURATION: 98 % | TEMPERATURE: 98 F | WEIGHT: 199.96 LBS | DIASTOLIC BLOOD PRESSURE: 97 MMHG | HEART RATE: 53 BPM

## 2024-09-22 LAB
FLUAV AG NPH QL: SIGNIFICANT CHANGE UP
FLUBV AG NPH QL: SIGNIFICANT CHANGE UP
RSV RNA NPH QL NAA+NON-PROBE: SIGNIFICANT CHANGE UP
SARS-COV-2 RNA SPEC QL NAA+PROBE: SIGNIFICANT CHANGE UP

## 2024-09-22 PROCEDURE — 96372 THER/PROPH/DIAG INJ SC/IM: CPT

## 2024-09-22 PROCEDURE — T1013: CPT

## 2024-09-22 PROCEDURE — 70450 CT HEAD/BRAIN W/O DYE: CPT | Mod: MC

## 2024-09-22 PROCEDURE — 99284 EMERGENCY DEPT VISIT MOD MDM: CPT | Mod: 25

## 2024-09-22 PROCEDURE — 99284 EMERGENCY DEPT VISIT MOD MDM: CPT

## 2024-09-22 PROCEDURE — 87637 SARSCOV2&INF A&B&RSV AMP PRB: CPT

## 2024-09-22 RX ORDER — DEXAMETHASONE 0.75 MG
6 TABLET ORAL ONCE
Refills: 0 | Status: COMPLETED | OUTPATIENT
Start: 2024-09-22 | End: 2024-09-22

## 2024-09-22 RX ORDER — AMOXICILLIN 500 MG
1 CAPSULE ORAL
Refills: 0
Start: 2024-09-22

## 2024-09-22 RX ADMIN — Medication 6 MILLIGRAM(S): at 19:07

## 2024-09-22 NOTE — ED PROVIDER NOTE - OBJECTIVE STATEMENT
59-year-old male presents to ED complaining of left thumb pain x 1 week and dizziness x 3 days.  Patient explained that he was seen in urgent care where he was told to follow-up with orthopedic and neurologist.  Patient explained that he was told to follow with a neurologist due to his dizziness.  Patient admits to dizziness but denies any nausea or vomiting.  Patient denies any lower upper extremity paresthesia, weakness or numbness.  Patient denies any significant past medical or surgical illness.  Patient denies any chest pain, shortness of breath or difficulty breathing.  Patient also denies any sick contact prior to the onset of her symptoms.

## 2024-09-22 NOTE — ED PROVIDER NOTE - CLINICAL SUMMARY MEDICAL DECISION MAKING FREE TEXT BOX
Alonzo Champion PA-C: PT signed out to DEREK champion after lengthy discussion about case with Dr. Erika CORBINC: PT with stable VS, no acute distress, non toxic appearing, tolerating PO in the ED, Pt with no acute findigns while in the ED, Pt with sig clijincal improvement, Pt cleared for dc home with supportive care, follow up to PCP, Pt educated about when to return to the ED if needed. PT verbalizes that he understands all instructions and results. Pt infomred that ED is open and availible 24/7 365 days a yr, encouraged to return to the ED if they have any change in condition, or feel the need for revaluation.    utilized to obtain History, ROS, Physical Exam, explanations of results and plan of care, as well as follow up instructions.

## 2024-09-22 NOTE — ED PROVIDER NOTE - NSFOLLOWUPINSTRUCTIONS_ED_ALL_ED_FT
Patient education: Dizziness in adults – ED discharge instructions (The Basics)  Written by the doctors and editors at [facility]  Please read the Disclaimer at the end of this page.    What are discharge instructions?  Discharge instructions are information about how to take care of yourself after getting medical care in the emergency department ("ED").    What should I know?  You came to the ED for dizziness. Dizziness can feel different to different people. For example, you might feel lightheaded or like you are about to pass out. You might have trouble walking straight or feel like you are about to fall. Some people feel like they are spinning or the world around them is spinning. This spinning feeling is called "vertigo."    Many things can make you feel dizzy. Some are serious things like heart problems or a stroke. Less serious things, like getting up too quickly or not drinking enough fluids, can also make you feel dizzy. Some medicines can also cause you to feel dizzy.    After seeing you, the doctor thinks the cause of your dizziness is not serious. If they think you have vertigo, they might give you medicine to help with your symptoms.    How do I care for myself at home?  Ask the doctor or nurse what you should do when you go home. Make sure you understand exactly what you need to do to care for yourself. Ask questions if there is anything you do not understand.    You should also:    ?Call your regular doctor and tell them you were in the ED. Make a follow-up appointment if you were told to.    ?Avoid changing positions too quickly. Be careful when moving from sitting to standing.    ?Sit on the edge of the bed for a few minutes before you stand up. Start to walk slowly after you stand up.    ?Move your legs often if you need to sit or  1 position for a long time.    ?Drink enough fluids, even if you do not feel thirsty.    ?Sit or lie down right away if you feel faint or dizzy. Be extra careful to avoid falling. Avoid driving when you feel dizzy. If you feel dizzy while driving, pull over right away.    ?Use a cane or a walker if you have to walk when you feel dizzy. Or have another person help you walk, so you don't fall.    When should I get emergency help?    ?Call for emergency help right away (in the [place] and [place], call 9-1-1) if you:    •Have new weakness in your arms or legs    •Develop new trouble speaking, swallowing, seeing, or hearing    •Have chest pain, an irregular heartbeat, or trouble breathing    •Have a seizure    •Cannot walk or stand because of your dizziness    When should I call the doctor?  Call for advice if:    ?Your dizziness continues for a long time or gets worse.    ?You have new or worsening symptoms.

## 2024-09-22 NOTE — ED PROVIDER NOTE - PATIENT PORTAL LINK FT
You can access the FollowMyHealth Patient Portal offered by [facility] by registering at the following website: [email or web address]. By joining [facility]’s FollowMyHealth portal, you will also be able to view your health information using other applications (apps) compatible with our system.

## 2024-09-22 NOTE — ED ADULT TRIAGE NOTE - CHIEF COMPLAINT QUOTE
dizziness when going to stand  for 3 days sent in from urgent care for CT scan. also c/o pain to left thumb with brace
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
## 2024-09-22 NOTE — ED PROVIDER NOTE - ADDITIONAL NOTES AND INSTRUCTIONS:
PT was evaluated At [facility] ED and was found to have a condition that warranted time of to rest and heal from WORK/SCHOOL.   Alonzo Champion PA-C

## 2024-09-23 PROCEDURE — 70450 CT HEAD/BRAIN W/O DYE: CPT | Mod: 26,MC

## 2024-09-25 ENCOUNTER — OUTPATIENT (OUTPATIENT)
Dept: OUTPATIENT SERVICES | Facility: HOSPITAL | Age: 59
LOS: 1 days | End: 2024-09-25

## 2024-09-25 ENCOUNTER — APPOINTMENT (OUTPATIENT)
Dept: ULTRASOUND IMAGING | Facility: CLINIC | Age: 59
End: 2024-09-25
Payer: COMMERCIAL

## 2024-09-25 ENCOUNTER — APPOINTMENT (OUTPATIENT)
Dept: ULTRASOUND IMAGING | Facility: CLINIC | Age: 59
End: 2024-09-25

## 2024-09-25 DIAGNOSIS — R91.8 OTHER NONSPECIFIC ABNORMAL FINDING OF LUNG FIELD: ICD-10-CM

## 2024-09-25 PROCEDURE — 76700 US EXAM ABDOM COMPLETE: CPT | Mod: 26

## 2024-09-25 PROCEDURE — 76857 US EXAM PELVIC LIMITED: CPT | Mod: 26

## 2024-10-08 ENCOUNTER — APPOINTMENT (OUTPATIENT)
Dept: CT IMAGING | Facility: CLINIC | Age: 59
End: 2024-10-08

## 2024-11-26 ENCOUNTER — OUTPATIENT (OUTPATIENT)
Dept: OUTPATIENT SERVICES | Facility: HOSPITAL | Age: 59
LOS: 1 days | End: 2024-11-26
Payer: COMMERCIAL

## 2024-11-26 ENCOUNTER — APPOINTMENT (OUTPATIENT)
Dept: CT IMAGING | Facility: CLINIC | Age: 59
End: 2024-11-26

## 2024-11-26 DIAGNOSIS — R91.8 OTHER NONSPECIFIC ABNORMAL FINDING OF LUNG FIELD: ICD-10-CM

## 2024-11-26 PROCEDURE — 71250 CT THORAX DX C-: CPT | Mod: 26

## 2024-12-03 ENCOUNTER — OUTPATIENT (OUTPATIENT)
Dept: OUTPATIENT SERVICES | Facility: HOSPITAL | Age: 59
LOS: 1 days | End: 2024-12-03

## 2024-12-03 ENCOUNTER — APPOINTMENT (OUTPATIENT)
Dept: PULMONOLOGY | Facility: CLINIC | Age: 59
End: 2024-12-03
Payer: COMMERCIAL

## 2024-12-03 ENCOUNTER — APPOINTMENT (OUTPATIENT)
Dept: ULTRASOUND IMAGING | Facility: CLINIC | Age: 59
End: 2024-12-03
Payer: COMMERCIAL

## 2024-12-03 VITALS
WEIGHT: 209 LBS | OXYGEN SATURATION: 98 % | HEIGHT: 68 IN | RESPIRATION RATE: 16 BRPM | DIASTOLIC BLOOD PRESSURE: 90 MMHG | SYSTOLIC BLOOD PRESSURE: 142 MMHG | BODY MASS INDEX: 31.67 KG/M2 | HEART RATE: 68 BPM

## 2024-12-03 DIAGNOSIS — R93.89 ABNORMAL FINDINGS ON DIAGNOSTIC IMAGING OF OTHER SPECIFIED BODY STRUCTURES: ICD-10-CM

## 2024-12-03 DIAGNOSIS — Z86.16 PERSONAL HISTORY OF COVID-19: ICD-10-CM

## 2024-12-03 DIAGNOSIS — J45.20 MILD INTERMITTENT ASTHMA, UNCOMPLICATED: ICD-10-CM

## 2024-12-03 DIAGNOSIS — G47.33 OBSTRUCTIVE SLEEP APNEA (ADULT) (PEDIATRIC): ICD-10-CM

## 2024-12-03 DIAGNOSIS — R09.82 POSTNASAL DRIP: ICD-10-CM

## 2024-12-03 DIAGNOSIS — Z87.891 PERSONAL HISTORY OF NICOTINE DEPENDENCE: ICD-10-CM

## 2024-12-03 DIAGNOSIS — E66.3 OVERWEIGHT: ICD-10-CM

## 2024-12-03 DIAGNOSIS — R06.02 SHORTNESS OF BREATH: ICD-10-CM

## 2024-12-03 DIAGNOSIS — R74.01 ELEVATION OF LEVELS OF LIVER TRANSAMINASE LEVELS: ICD-10-CM

## 2024-12-03 DIAGNOSIS — E66.9 OBESITY, UNSPECIFIED: ICD-10-CM

## 2024-12-03 DIAGNOSIS — R91.8 OTHER NONSPECIFIC ABNORMAL FINDING OF LUNG FIELD: ICD-10-CM

## 2024-12-03 PROCEDURE — 76705 ECHO EXAM OF ABDOMEN: CPT | Mod: 26

## 2024-12-03 PROCEDURE — 99214 OFFICE O/P EST MOD 30 MIN: CPT

## 2025-06-09 ENCOUNTER — APPOINTMENT (OUTPATIENT)
Dept: PULMONOLOGY | Facility: CLINIC | Age: 60
End: 2025-06-09
Payer: COMMERCIAL

## 2025-06-09 ENCOUNTER — NON-APPOINTMENT (OUTPATIENT)
Age: 60
End: 2025-06-09

## 2025-06-09 VITALS — BODY MASS INDEX: 31.7 KG/M2 | HEIGHT: 69 IN | WEIGHT: 214 LBS

## 2025-06-09 VITALS
RESPIRATION RATE: 16 BRPM | DIASTOLIC BLOOD PRESSURE: 80 MMHG | SYSTOLIC BLOOD PRESSURE: 132 MMHG | HEART RATE: 74 BPM | OXYGEN SATURATION: 98 %

## 2025-06-09 PROCEDURE — 85018 HEMOGLOBIN: CPT | Mod: QW

## 2025-06-09 PROCEDURE — 94010 BREATHING CAPACITY TEST: CPT

## 2025-06-09 PROCEDURE — 94727 GAS DIL/WSHOT DETER LNG VOL: CPT

## 2025-06-09 PROCEDURE — 99214 OFFICE O/P EST MOD 30 MIN: CPT | Mod: 25

## 2025-06-09 PROCEDURE — 94729 DIFFUSING CAPACITY: CPT

## 2025-06-09 RX ORDER — SERTRALINE HYDROCHLORIDE 25 MG/1
TABLET, FILM COATED ORAL
Refills: 0 | Status: ACTIVE | COMMUNITY

## 2025-06-09 RX ORDER — PRAZOSIN HYDROCHLORIDE 1 MG/1
1 CAPSULE ORAL
Refills: 0 | Status: ACTIVE | COMMUNITY

## 2025-08-21 ENCOUNTER — EMERGENCY (EMERGENCY)
Facility: HOSPITAL | Age: 60
LOS: 1 days | End: 2025-08-21
Attending: STUDENT IN AN ORGANIZED HEALTH CARE EDUCATION/TRAINING PROGRAM
Payer: COMMERCIAL

## 2025-08-21 VITALS
WEIGHT: 221.56 LBS | OXYGEN SATURATION: 98 % | HEART RATE: 56 BPM | RESPIRATION RATE: 18 BRPM | DIASTOLIC BLOOD PRESSURE: 79 MMHG | SYSTOLIC BLOOD PRESSURE: 143 MMHG | TEMPERATURE: 98 F

## 2025-08-21 LAB
ALBUMIN SERPL ELPH-MCNC: 3.8 G/DL — SIGNIFICANT CHANGE UP (ref 3.3–5.2)
ALP SERPL-CCNC: 112 U/L — SIGNIFICANT CHANGE UP (ref 40–120)
ALT FLD-CCNC: 20 U/L — SIGNIFICANT CHANGE UP
ANION GAP SERPL CALC-SCNC: 11 MMOL/L — SIGNIFICANT CHANGE UP (ref 5–17)
AST SERPL-CCNC: 20 U/L — SIGNIFICANT CHANGE UP
BASOPHILS # BLD AUTO: 0.05 K/UL — SIGNIFICANT CHANGE UP (ref 0–0.2)
BASOPHILS NFR BLD AUTO: 0.8 % — SIGNIFICANT CHANGE UP (ref 0–2)
BILIRUB SERPL-MCNC: 0.3 MG/DL — LOW (ref 0.4–2)
BUN SERPL-MCNC: 18 MG/DL — SIGNIFICANT CHANGE UP (ref 8–20)
CALCIUM SERPL-MCNC: 9 MG/DL — SIGNIFICANT CHANGE UP (ref 8.4–10.5)
CHLORIDE SERPL-SCNC: 105 MMOL/L — SIGNIFICANT CHANGE UP (ref 96–108)
CO2 SERPL-SCNC: 25 MMOL/L — SIGNIFICANT CHANGE UP (ref 22–29)
CREAT SERPL-MCNC: 1.09 MG/DL — SIGNIFICANT CHANGE UP (ref 0.5–1.3)
EGFR: 78 ML/MIN/1.73M2 — SIGNIFICANT CHANGE UP
EGFR: 78 ML/MIN/1.73M2 — SIGNIFICANT CHANGE UP
EOSINOPHIL # BLD AUTO: 0.19 K/UL — SIGNIFICANT CHANGE UP (ref 0–0.5)
EOSINOPHIL NFR BLD AUTO: 2.9 % — SIGNIFICANT CHANGE UP (ref 0–6)
GLUCOSE SERPL-MCNC: 106 MG/DL — HIGH (ref 70–99)
HCT VFR BLD CALC: 39.3 % — SIGNIFICANT CHANGE UP (ref 39–50)
HGB BLD-MCNC: 13.8 G/DL — SIGNIFICANT CHANGE UP (ref 13–17)
IMM GRANULOCYTES # BLD AUTO: 0.03 K/UL — SIGNIFICANT CHANGE UP (ref 0–0.07)
IMM GRANULOCYTES NFR BLD AUTO: 0.5 % — SIGNIFICANT CHANGE UP (ref 0–0.9)
LYMPHOCYTES # BLD AUTO: 1.95 K/UL — SIGNIFICANT CHANGE UP (ref 1–3.3)
LYMPHOCYTES NFR BLD AUTO: 29.6 % — SIGNIFICANT CHANGE UP (ref 13–44)
MCHC RBC-ENTMCNC: 30.8 PG — SIGNIFICANT CHANGE UP (ref 27–34)
MCHC RBC-ENTMCNC: 35.1 G/DL — SIGNIFICANT CHANGE UP (ref 32–36)
MCV RBC AUTO: 87.7 FL — SIGNIFICANT CHANGE UP (ref 80–100)
MONOCYTES # BLD AUTO: 0.65 K/UL — SIGNIFICANT CHANGE UP (ref 0–0.9)
MONOCYTES NFR BLD AUTO: 9.9 % — SIGNIFICANT CHANGE UP (ref 2–14)
NEUTROPHILS # BLD AUTO: 3.71 K/UL — SIGNIFICANT CHANGE UP (ref 1.8–7.4)
NEUTROPHILS NFR BLD AUTO: 56.3 % — SIGNIFICANT CHANGE UP (ref 43–77)
NRBC # BLD AUTO: 0 K/UL — SIGNIFICANT CHANGE UP (ref 0–0)
NRBC # FLD: 0 K/UL — SIGNIFICANT CHANGE UP (ref 0–0)
NRBC BLD AUTO-RTO: 0 /100 WBCS — SIGNIFICANT CHANGE UP (ref 0–0)
PLATELET # BLD AUTO: 174 K/UL — SIGNIFICANT CHANGE UP (ref 150–400)
PMV BLD: 8.9 FL — SIGNIFICANT CHANGE UP (ref 7–13)
POTASSIUM SERPL-MCNC: 4.2 MMOL/L — SIGNIFICANT CHANGE UP (ref 3.5–5.3)
POTASSIUM SERPL-SCNC: 4.2 MMOL/L — SIGNIFICANT CHANGE UP (ref 3.5–5.3)
PROT SERPL-MCNC: 6.4 G/DL — LOW (ref 6.6–8.7)
RBC # BLD: 4.48 M/UL — SIGNIFICANT CHANGE UP (ref 4.2–5.8)
RBC # FLD: 12.3 % — SIGNIFICANT CHANGE UP (ref 10.3–14.5)
SODIUM SERPL-SCNC: 140 MMOL/L — SIGNIFICANT CHANGE UP (ref 135–145)
WBC # BLD: 6.58 K/UL — SIGNIFICANT CHANGE UP (ref 3.8–10.5)
WBC # FLD AUTO: 6.58 K/UL — SIGNIFICANT CHANGE UP (ref 3.8–10.5)

## 2025-08-21 PROCEDURE — 93005 ELECTROCARDIOGRAM TRACING: CPT

## 2025-08-21 PROCEDURE — 80053 COMPREHEN METABOLIC PANEL: CPT

## 2025-08-21 PROCEDURE — 70450 CT HEAD/BRAIN W/O DYE: CPT

## 2025-08-21 PROCEDURE — 93010 ELECTROCARDIOGRAM REPORT: CPT

## 2025-08-21 PROCEDURE — 36415 COLL VENOUS BLD VENIPUNCTURE: CPT

## 2025-08-21 PROCEDURE — 99285 EMERGENCY DEPT VISIT HI MDM: CPT

## 2025-08-21 PROCEDURE — 85025 COMPLETE CBC W/AUTO DIFF WBC: CPT

## 2025-08-21 RX ORDER — ACETAMINOPHEN 500 MG/5ML
1000 LIQUID (ML) ORAL ONCE
Refills: 0 | Status: COMPLETED | OUTPATIENT
Start: 2025-08-21 | End: 2025-08-21

## 2025-08-21 RX ORDER — METOCLOPRAMIDE HCL 10 MG
10 TABLET ORAL ONCE
Refills: 0 | Status: COMPLETED | OUTPATIENT
Start: 2025-08-21 | End: 2025-08-21

## 2025-08-21 RX ORDER — DIPHENHYDRAMINE HCL 12.5MG/5ML
25 ELIXIR ORAL ONCE
Refills: 0 | Status: COMPLETED | OUTPATIENT
Start: 2025-08-21 | End: 2025-08-21

## 2025-08-21 RX ADMIN — Medication 25 MILLIGRAM(S): at 22:26

## 2025-08-21 RX ADMIN — Medication 400 MILLIGRAM(S): at 22:25

## 2025-08-21 RX ADMIN — Medication 10 MILLIGRAM(S): at 22:26

## 2025-08-22 VITALS
HEART RATE: 75 BPM | SYSTOLIC BLOOD PRESSURE: 160 MMHG | TEMPERATURE: 97 F | OXYGEN SATURATION: 99 % | RESPIRATION RATE: 17 BRPM | DIASTOLIC BLOOD PRESSURE: 92 MMHG

## 2025-08-22 PROCEDURE — 70450 CT HEAD/BRAIN W/O DYE: CPT | Mod: 26

## (undated) DEVICE — HANDLE LT SNAP ON ULT DURABLE LENS FOR TRUMPF ALC DISPOSABLE

## (undated) DEVICE — INFECTION CONTROL KIT SYS

## (undated) DEVICE — DBD-PACK,LAPAROTOMY,2 REINFORCED GOWNS: Brand: MEDLINE

## (undated) DEVICE — REM POLYHESIVE ADULT PATIENT RETURN ELECTRODE: Brand: VALLEYLAB

## (undated) DEVICE — POOLE SUCTION INSTRUMENT WITH REMOVABLE SHEATH: Brand: POOLE

## (undated) DEVICE — SYR 10ML LUER LOK 1/5ML GRAD --

## (undated) DEVICE — NEEDLE HYPO 25GA L1.5IN BVL ORIENTED ECLIPSE

## (undated) DEVICE — ROCKER SWITCH PENCIL BLADE ELECTRODE, HOLSTER: Brand: EDGE

## (undated) DEVICE — STERILE POLYISOPRENE POWDER-FREE SURGICAL GLOVES: Brand: PROTEXIS

## (undated) DEVICE — TOWEL SURG W17XL27IN STD BLU COT NONFENESTRATED PREWASHED

## (undated) DEVICE — DEVON™ KNEE AND BODY STRAP 60" X 3" (1.5 M X 7.6 CM): Brand: DEVON

## (undated) DEVICE — SOLUTION IV 1000ML 0.9% SOD CHL

## (undated) DEVICE — KENDALL SCD EXPRESS SLEEVES, KNEE LENGTH, MEDIUM: Brand: KENDALL SCD

## (undated) DEVICE — SUTURE VCRL SZ 2-0 L27IN ABSRB VLT L26MM SH 1/2 CIR J317H

## (undated) DEVICE — JELLY,LUBE,STERILE,FLIP TOP,TUBE,4-OZ: Brand: MEDLINE

## (undated) DEVICE — SUT CHRMC 3-0 27IN SH BRN --

## (undated) DEVICE — SUT SLK 3-0 30IN SH BLK --

## (undated) DEVICE — SURGICAL PROCEDURE PACK BASIN MAJ SET CUST NO CAUT

## (undated) DEVICE — BLADE ASSEMB CLP HAIR FINE --